# Patient Record
Sex: MALE | Race: WHITE | NOT HISPANIC OR LATINO
[De-identification: names, ages, dates, MRNs, and addresses within clinical notes are randomized per-mention and may not be internally consistent; named-entity substitution may affect disease eponyms.]

---

## 2017-03-29 ENCOUNTER — APPOINTMENT (OUTPATIENT)
Dept: UROLOGY | Facility: CLINIC | Age: 57
End: 2017-03-29

## 2017-04-27 PROBLEM — Z87.442 HISTORY OF RENAL CALCULI: Status: RESOLVED | Noted: 2017-04-27 | Resolved: 2017-04-27

## 2017-04-27 PROBLEM — I86.1 VARICOCELE: Status: RESOLVED | Noted: 2017-04-27 | Resolved: 2017-04-27

## 2017-04-27 PROBLEM — N35.9 MEATAL STENOSIS: Status: RESOLVED | Noted: 2017-04-27 | Resolved: 2017-04-27

## 2017-04-27 PROBLEM — K46.9 HERNIA: Status: RESOLVED | Noted: 2017-04-27 | Resolved: 2017-04-27

## 2017-04-27 PROBLEM — Z80.9 FAMILY HISTORY OF MALIGNANT NEOPLASM: Status: ACTIVE | Noted: 2017-04-27

## 2017-04-27 PROBLEM — Z86.39 HISTORY OF HYPERCHOLESTEROLEMIA: Status: RESOLVED | Noted: 2017-04-27 | Resolved: 2017-04-27

## 2017-04-27 PROBLEM — Z87.19 HISTORY OF ESOPHAGEAL REFLUX: Status: RESOLVED | Noted: 2017-04-27 | Resolved: 2017-04-27

## 2017-04-28 ENCOUNTER — APPOINTMENT (OUTPATIENT)
Dept: UROLOGY | Facility: CLINIC | Age: 57
End: 2017-04-28

## 2017-04-28 DIAGNOSIS — Z00.00 ENCOUNTER FOR GENERAL ADULT MEDICAL EXAMINATION W/OUT ABNORMAL FINDINGS: ICD-10-CM

## 2017-04-28 DIAGNOSIS — Z87.19 PERSONAL HISTORY OF OTHER DISEASES OF THE DIGESTIVE SYSTEM: ICD-10-CM

## 2017-04-28 DIAGNOSIS — Z87.442 PERSONAL HISTORY OF URINARY CALCULI: ICD-10-CM

## 2017-04-28 DIAGNOSIS — N35.9 URETHRAL STRICTURE, UNSPECIFIED: ICD-10-CM

## 2017-04-28 DIAGNOSIS — Z80.9 FAMILY HISTORY OF MALIGNANT NEOPLASM, UNSPECIFIED: ICD-10-CM

## 2017-04-28 DIAGNOSIS — I86.1 SCROTAL VARICES: ICD-10-CM

## 2017-04-28 DIAGNOSIS — K46.9 UNSPECIFIED ABDOMINAL HERNIA W/OUT OBSTRUCTION OR GANGRENE: ICD-10-CM

## 2017-04-28 DIAGNOSIS — Z86.39 PERSONAL HISTORY OF OTHER ENDOCRINE, NUTRITIONAL AND METABOLIC DISEASE: ICD-10-CM

## 2017-04-28 RX ORDER — SILDENAFIL CITRATE 100 MG/1
100 TABLET, FILM COATED ORAL
Qty: 6 | Refills: 3 | Status: ACTIVE | COMMUNITY
Start: 2017-04-28 | End: 1900-01-01

## 2017-04-28 RX ORDER — TADALAFIL 20 MG/1
20 TABLET, FILM COATED ORAL
Qty: 6 | Refills: 1 | Status: ACTIVE | COMMUNITY
Start: 2017-04-28 | End: 1900-01-01

## 2017-06-08 ENCOUNTER — OTHER (OUTPATIENT)
Age: 57
End: 2017-06-08

## 2017-06-26 ENCOUNTER — OTHER (OUTPATIENT)
Age: 57
End: 2017-06-26

## 2017-07-21 ENCOUNTER — TRANSCRIPTION ENCOUNTER (OUTPATIENT)
Age: 57
End: 2017-07-21

## 2017-07-21 ENCOUNTER — APPOINTMENT (OUTPATIENT)
Dept: UROLOGY | Facility: CLINIC | Age: 57
End: 2017-07-21

## 2017-07-21 VITALS
WEIGHT: 208 LBS | DIASTOLIC BLOOD PRESSURE: 80 MMHG | SYSTOLIC BLOOD PRESSURE: 122 MMHG | HEART RATE: 68 BPM | BODY MASS INDEX: 29.12 KG/M2 | HEIGHT: 71 IN | RESPIRATION RATE: 16 BRPM

## 2017-07-24 LAB
ANION GAP SERPL CALC-SCNC: 16 MMOL/L
APPEARANCE: CLEAR
BACTERIA UR CULT: NORMAL
BACTERIA: NEGATIVE
BILIRUBIN URINE: NEGATIVE
BLOOD URINE: NEGATIVE
BUN SERPL-MCNC: 28 MG/DL
CALCIUM SERPL-MCNC: 9.6 MG/DL
CHLORIDE SERPL-SCNC: 105 MMOL/L
CO2 SERPL-SCNC: 23 MMOL/L
COLOR: YELLOW
CREAT SERPL-MCNC: 1.57 MG/DL
ESTRADIOL SERPL-MCNC: 33 PG/ML
FSH SERPL-MCNC: 31.9 IU/L
GLUCOSE QUALITATIVE U: NORMAL MG/DL
GLUCOSE SERPL-MCNC: 86 MG/DL
HYALINE CASTS: 0 /LPF
KETONES URINE: NEGATIVE
LEUKOCYTE ESTERASE URINE: NEGATIVE
LH SERPL-ACNC: 18.4 IU/L
MICROSCOPIC-UA: NORMAL
NITRITE URINE: NEGATIVE
PH URINE: 5
POTASSIUM SERPL-SCNC: 4.7 MMOL/L
PROTEIN URINE: NEGATIVE MG/DL
RED BLOOD CELLS URINE: 1 /HPF
SODIUM SERPL-SCNC: 144 MMOL/L
SPECIFIC GRAVITY URINE: 1.03
SQUAMOUS EPITHELIAL CELLS: 2 /HPF
TESTOST SERPL-MCNC: 496.3 NG/DL
UROBILINOGEN URINE: NORMAL MG/DL
WHITE BLOOD CELLS URINE: 2 /HPF

## 2017-07-28 ENCOUNTER — MEDICATION RENEWAL (OUTPATIENT)
Age: 57
End: 2017-07-28

## 2017-07-28 RX ORDER — TADALAFIL 20 MG/1
20 TABLET, FILM COATED ORAL
Qty: 6 | Refills: 1 | Status: ACTIVE | COMMUNITY
Start: 2017-07-21 | End: 1900-01-01

## 2017-08-04 ENCOUNTER — APPOINTMENT (OUTPATIENT)
Dept: UROLOGY | Facility: CLINIC | Age: 57
End: 2017-08-04
Payer: COMMERCIAL

## 2017-08-04 VITALS — SYSTOLIC BLOOD PRESSURE: 140 MMHG | RESPIRATION RATE: 16 BRPM | HEART RATE: 60 BPM | DIASTOLIC BLOOD PRESSURE: 80 MMHG

## 2017-08-04 PROCEDURE — 52000 CYSTOURETHROSCOPY: CPT

## 2017-08-08 LAB — CORE LAB FLUID CYTOLOGY: NORMAL

## 2017-08-30 ENCOUNTER — OUTPATIENT (OUTPATIENT)
Dept: OUTPATIENT SERVICES | Facility: HOSPITAL | Age: 57
LOS: 1 days | End: 2017-08-30
Payer: COMMERCIAL

## 2017-08-30 ENCOUNTER — APPOINTMENT (OUTPATIENT)
Dept: SURGERY | Facility: CLINIC | Age: 57
End: 2017-08-30

## 2017-08-30 DIAGNOSIS — Z01.818 ENCOUNTER FOR OTHER PREPROCEDURAL EXAMINATION: ICD-10-CM

## 2017-08-30 DIAGNOSIS — R35.0 FREQUENCY OF MICTURITION: ICD-10-CM

## 2017-08-30 LAB
ANION GAP SERPL CALC-SCNC: 13 MMOL/L — SIGNIFICANT CHANGE UP (ref 5–17)
APPEARANCE UR: CLEAR — SIGNIFICANT CHANGE UP
BILIRUB UR-MCNC: NEGATIVE — SIGNIFICANT CHANGE UP
BUN SERPL-MCNC: 23 MG/DL — SIGNIFICANT CHANGE UP (ref 7–23)
CALCIUM SERPL-MCNC: 9.5 MG/DL — SIGNIFICANT CHANGE UP (ref 8.4–10.5)
CHLORIDE SERPL-SCNC: 103 MMOL/L — SIGNIFICANT CHANGE UP (ref 96–108)
CO2 SERPL-SCNC: 26 MMOL/L — SIGNIFICANT CHANGE UP (ref 22–31)
COLOR SPEC: YELLOW — SIGNIFICANT CHANGE UP
CREAT SERPL-MCNC: 1.4 MG/DL — HIGH (ref 0.5–1.3)
DIFF PNL FLD: NEGATIVE — SIGNIFICANT CHANGE UP
GLUCOSE SERPL-MCNC: 99 MG/DL — SIGNIFICANT CHANGE UP (ref 70–99)
GLUCOSE UR QL: NEGATIVE — SIGNIFICANT CHANGE UP
HCT VFR BLD CALC: 43.6 % — SIGNIFICANT CHANGE UP (ref 39–50)
HGB BLD-MCNC: 14.9 G/DL — SIGNIFICANT CHANGE UP (ref 13–17)
KETONES UR-MCNC: NEGATIVE — SIGNIFICANT CHANGE UP
LEUKOCYTE ESTERASE UR-ACNC: NEGATIVE — SIGNIFICANT CHANGE UP
MCHC RBC-ENTMCNC: 31 PG — SIGNIFICANT CHANGE UP (ref 27–34)
MCHC RBC-ENTMCNC: 34.2 G/DL — SIGNIFICANT CHANGE UP (ref 32–36)
MCV RBC AUTO: 90.8 FL — SIGNIFICANT CHANGE UP (ref 80–100)
NITRITE UR-MCNC: NEGATIVE — SIGNIFICANT CHANGE UP
PH UR: 5.5 — SIGNIFICANT CHANGE UP (ref 5–8)
PLATELET # BLD AUTO: 181 K/UL — SIGNIFICANT CHANGE UP (ref 150–400)
POTASSIUM SERPL-MCNC: 4.6 MMOL/L — SIGNIFICANT CHANGE UP (ref 3.5–5.3)
POTASSIUM SERPL-SCNC: 4.6 MMOL/L — SIGNIFICANT CHANGE UP (ref 3.5–5.3)
PROT UR-MCNC: NEGATIVE MG/DL — SIGNIFICANT CHANGE UP
RBC # BLD: 4.8 M/UL — SIGNIFICANT CHANGE UP (ref 4.2–5.8)
RBC # FLD: 12.4 % — SIGNIFICANT CHANGE UP (ref 10.3–16.9)
SODIUM SERPL-SCNC: 142 MMOL/L — SIGNIFICANT CHANGE UP (ref 135–145)
SP GR SPEC: 1.02 — SIGNIFICANT CHANGE UP (ref 1–1.03)
UROBILINOGEN FLD QL: 0.2 E.U./DL — SIGNIFICANT CHANGE UP
WBC # BLD: 8 K/UL — SIGNIFICANT CHANGE UP (ref 3.8–10.5)
WBC # FLD AUTO: 8 K/UL — SIGNIFICANT CHANGE UP (ref 3.8–10.5)

## 2017-08-30 PROCEDURE — 93010 ELECTROCARDIOGRAM REPORT: CPT

## 2017-09-19 ENCOUNTER — RESULT REVIEW (OUTPATIENT)
Age: 57
End: 2017-09-19

## 2017-09-19 ENCOUNTER — OUTPATIENT (OUTPATIENT)
Dept: OUTPATIENT SERVICES | Facility: HOSPITAL | Age: 57
LOS: 1 days | Discharge: HOME | End: 2017-09-19

## 2017-09-22 DIAGNOSIS — I10 ESSENTIAL (PRIMARY) HYPERTENSION: ICD-10-CM

## 2017-09-22 DIAGNOSIS — N40.0 BENIGN PROSTATIC HYPERPLASIA WITHOUT LOWER URINARY TRACT SYMPTOMS: ICD-10-CM

## 2017-09-22 DIAGNOSIS — G47.33 OBSTRUCTIVE SLEEP APNEA (ADULT) (PEDIATRIC): ICD-10-CM

## 2017-09-24 ENCOUNTER — MOBILE ON CALL (OUTPATIENT)
Age: 57
End: 2017-09-24

## 2017-09-25 ENCOUNTER — APPOINTMENT (OUTPATIENT)
Dept: UROLOGY | Facility: CLINIC | Age: 57
End: 2017-09-25
Payer: COMMERCIAL

## 2017-09-25 PROCEDURE — 99214 OFFICE O/P EST MOD 30 MIN: CPT

## 2017-12-22 ENCOUNTER — APPOINTMENT (OUTPATIENT)
Dept: UROLOGY | Facility: CLINIC | Age: 57
End: 2017-12-22
Payer: COMMERCIAL

## 2017-12-22 PROCEDURE — 99213 OFFICE O/P EST LOW 20 MIN: CPT

## 2018-07-20 ENCOUNTER — APPOINTMENT (OUTPATIENT)
Dept: UROLOGY | Facility: CLINIC | Age: 58
End: 2018-07-20
Payer: COMMERCIAL

## 2018-07-20 DIAGNOSIS — N53.19 OTHER EJACULATORY DYSFUNCTION: ICD-10-CM

## 2018-07-20 PROCEDURE — 51741 ELECTRO-UROFLOWMETRY FIRST: CPT

## 2018-07-20 PROCEDURE — 99213 OFFICE O/P EST LOW 20 MIN: CPT | Mod: 25

## 2018-07-21 ENCOUNTER — TRANSCRIPTION ENCOUNTER (OUTPATIENT)
Age: 58
End: 2018-07-21

## 2018-09-17 ENCOUNTER — APPOINTMENT (OUTPATIENT)
Dept: UROLOGY | Facility: CLINIC | Age: 58
End: 2018-09-17
Payer: COMMERCIAL

## 2018-09-17 VITALS — DIASTOLIC BLOOD PRESSURE: 78 MMHG | SYSTOLIC BLOOD PRESSURE: 128 MMHG

## 2018-09-17 PROCEDURE — 76872 US TRANSRECTAL: CPT

## 2018-10-16 ENCOUNTER — OUTPATIENT (OUTPATIENT)
Dept: OUTPATIENT SERVICES | Facility: HOSPITAL | Age: 58
LOS: 1 days | Discharge: HOME | End: 2018-10-16
Payer: COMMERCIAL

## 2018-10-16 ENCOUNTER — APPOINTMENT (OUTPATIENT)
Dept: UROLOGY | Facility: AMBULATORY SURGERY CENTER | Age: 58
End: 2018-10-16

## 2018-10-16 PROCEDURE — 52442 CYSTO INS TRNSPRSTC IMPLT EA: CPT

## 2018-10-16 PROCEDURE — 52441 CYSTO INSJ TRNSPRSTC 1 IMPLT: CPT

## 2018-10-16 RX ORDER — OXYCODONE HYDROCHLORIDE 5 MG/1
1 TABLET ORAL
Qty: 10 | Refills: 0 | OUTPATIENT
Start: 2018-10-16

## 2018-10-16 RX ORDER — CEPHALEXIN 500 MG
1 CAPSULE ORAL
Qty: 10 | Refills: 0 | OUTPATIENT
Start: 2018-10-16 | End: 2018-10-20

## 2018-10-19 ENCOUNTER — APPOINTMENT (OUTPATIENT)
Dept: UROLOGY | Facility: CLINIC | Age: 58
End: 2018-10-19
Payer: COMMERCIAL

## 2018-10-19 PROCEDURE — 99212 OFFICE O/P EST SF 10 MIN: CPT | Mod: 25

## 2018-10-19 PROCEDURE — 51798 US URINE CAPACITY MEASURE: CPT | Mod: 59

## 2018-10-19 PROCEDURE — 51741 ELECTRO-UROFLOWMETRY FIRST: CPT

## 2018-12-03 ENCOUNTER — APPOINTMENT (OUTPATIENT)
Dept: UROLOGY | Facility: CLINIC | Age: 58
End: 2018-12-03
Payer: COMMERCIAL

## 2018-12-03 PROCEDURE — 99213 OFFICE O/P EST LOW 20 MIN: CPT | Mod: 25

## 2018-12-03 PROCEDURE — 51798 US URINE CAPACITY MEASURE: CPT

## 2018-12-03 PROCEDURE — 51741 ELECTRO-UROFLOWMETRY FIRST: CPT

## 2019-06-03 ENCOUNTER — APPOINTMENT (OUTPATIENT)
Dept: UROLOGY | Facility: CLINIC | Age: 59
End: 2019-06-03
Payer: COMMERCIAL

## 2019-06-03 PROCEDURE — 99213 OFFICE O/P EST LOW 20 MIN: CPT

## 2019-06-03 NOTE — HISTORY OF PRESENT ILLNESS
[None] : no symptoms [FreeTextEntry1] : Urinary stream\par s/p urethral biopsy\par \par Infertility\par s/p varicocelecomy\par s/p clomid therapy\par \par 12.22.2017\par patient seen urgently in light of ? rise in PSA.\par patient continues on clomid\par PSA 3.3\par \par 7.20.2018\par PSA 1.9 (6/26/2018)\par T 515 \par free T 12.9\par estradiol 43.5\par FSH 33.6; LH 22.4\par cbc normal\par urine normal\par creatinine 1.9 after omaprazole starting seeing Milan Mcdonnell being evaluated\par USG is schedule\par \par 10.16.2018 UroLift\par \par 10.18.2018\par patient called yesterday\par complaining of spasm\par removed catheter at my direction\par urinating well with some urgency\par no pain, fevers chills etc \par  \par 12.3.2018\par Pleased with response to urolift\par \par  6.3.2019\par improved urinary symptoms after urolift\par still requires flomax\par no nocturia\par AUA sx 4\par YURIY 21 with cialis 20 mg [Urinary Incontinence] : no urinary incontinence [Urinary Retention] : no urinary retention [Urinary Urgency] : no urinary urgency [Urinary Frequency] : no urinary frequency [Nocturia] : no nocturia [Straining] : no straining [Weak Stream] : no weak stream

## 2019-06-03 NOTE — ASSESSMENT
[FreeTextEntry1] : creat 1.45\par ua normal\par Testosterone 424\par estradiol; 26\par PSA 2.1\par not interested in checking PSA\par Will stop clomid and reasess T in 3 months\par Patient urinary symptoms improved after urolift\par Will stop clomid and assess T\par Continue on flomax 0.4 mg daily\par \par Fu off of Clomid and sexual function in 9/2019

## 2019-06-03 NOTE — PHYSICAL EXAM
[Bowel Sounds] : normal bowel sounds [Abdomen Soft] : soft [Abdomen Tenderness] : non-tender [Urethral Meatus] : meatus normal [Penis Abnormality] : normal circumcised penis [Epididymis] : the epididymides were normal [Testes Tenderness] : no tenderness of the testes [FreeTextEntry1] : atrophic bilaterally; prostate +2  ; smooth

## 2019-09-09 ENCOUNTER — APPOINTMENT (OUTPATIENT)
Dept: UROLOGY | Facility: CLINIC | Age: 59
End: 2019-09-09

## 2019-09-27 ENCOUNTER — NON-APPOINTMENT (OUTPATIENT)
Age: 59
End: 2019-09-27

## 2019-09-27 ENCOUNTER — APPOINTMENT (OUTPATIENT)
Dept: OPHTHALMOLOGY | Facility: CLINIC | Age: 59
End: 2019-09-27
Payer: COMMERCIAL

## 2019-09-27 ENCOUNTER — APPOINTMENT (OUTPATIENT)
Dept: UROLOGY | Facility: CLINIC | Age: 59
End: 2019-09-27
Payer: COMMERCIAL

## 2019-09-27 VITALS — SYSTOLIC BLOOD PRESSURE: 130 MMHG | RESPIRATION RATE: 20 BRPM | DIASTOLIC BLOOD PRESSURE: 70 MMHG | HEART RATE: 80 BPM

## 2019-09-27 PROCEDURE — 99213 OFFICE O/P EST LOW 20 MIN: CPT

## 2019-09-27 PROCEDURE — 92025 CPTRIZED CORNEAL TOPOGRAPHY: CPT

## 2019-09-27 PROCEDURE — 92002 INTRM OPH EXAM NEW PATIENT: CPT

## 2019-09-27 NOTE — ASSESSMENT
[FreeTextEntry1] : creat 1.45   - off benicar started amlodipine 1.3\par ua normal\par Testosterone 424  off of clomid 364 free 5.5 (7,2-24)\par estradiol; 26           off of clomid 23.5\par PSA 2.1                  off of clomid 1.37\par \par DIscussed labs and symptoms\par Responds to cialis\par Generally fatigue but not clear if improved when on clomid\par \par \par We had an extensive discussion re Risks of T replacement; Patient was informed about Black box warning from FDA.\par We discussed recent data regarding increased risk of coronary plaque size, heart disease; thrombolic event; increased Hbg/Hct, breast tenderness; acne; irritability; sleep apnea and increased urinary symptoms; effect on testicular function including suppression of spermatogenesis; hair loss (male pattern baldness) effect on LFTS; effect on prostate cancer.\par Different treatment approaches were discussed including IM, transdermal and Testopel\par We discussed advantages and disadvantages of each\par \par Initially patient did not want to be treated and then decided to proceed with transdermal T\par The patient expressed fully understanding of the information provided, the consequences and the management.\par Will return for labs and fu 3 week/4 weeks\par Flow at time of next visit.

## 2019-09-27 NOTE — HISTORY OF PRESENT ILLNESS
[FreeTextEntry1] : Urinary stream\par s/p urethral biopsy\par \par Infertility\par s/p varicocelecomy\par s/p clomid therapy\par \par 12.22.2017\par patient seen urgently in light of ? rise in PSA.\par patient continues on clomid\par PSA 3.3\par \par 7.20.2018\par PSA 1.9 (6/26/2018)\par T 515 \par free T 12.9\par estradiol 43.5\par FSH 33.6; LH 22.4\par cbc normal\par urine normal\par creatinine 1.9 after omaprazole starting seeing Milan Mcdonnell being evaluated\par USG is schedule\par \par 10.16.2018 UroLift\par \par 10.18.2018\par patient called yesterday\par complaining of spasm\par removed catheter at my direction\par urinating well with some urgency\par no pain, fevers chills etc \par  \par 12.3.2018\par Pleased with response to urolift\par \par  6.3.2019\par improved urinary symptoms after urolift\par still requires flomax\par no nocturia\par AUA sx 4\par YURIY 21 with cialis 20 mg\par \par 9.27.2019\par continues to be pleased with urinary symptoms\par continues onflomax\par creatine 1.3t\par Testosterone  \par Lh 6.9\par FSH 14.0\par \par free T 5.5 (7-24)\par estradiol 23.5\par continues on Cialis prn\par

## 2019-10-29 LAB
ESTRADIOL SERPL-MCNC: 26 PG/ML
HCT VFR BLD CALC: 42.6 %
HGB BLD-MCNC: 14.2 G/DL

## 2019-11-03 ENCOUNTER — FORM ENCOUNTER (OUTPATIENT)
Age: 59
End: 2019-11-03

## 2019-11-04 ENCOUNTER — APPOINTMENT (OUTPATIENT)
Dept: RADIOLOGY | Facility: CLINIC | Age: 59
End: 2019-11-04
Payer: COMMERCIAL

## 2019-11-04 ENCOUNTER — APPOINTMENT (OUTPATIENT)
Dept: UROLOGY | Facility: CLINIC | Age: 59
End: 2019-11-04
Payer: COMMERCIAL

## 2019-11-04 ENCOUNTER — OUTPATIENT (OUTPATIENT)
Dept: OUTPATIENT SERVICES | Facility: HOSPITAL | Age: 59
LOS: 1 days | End: 2019-11-04

## 2019-11-04 DIAGNOSIS — N32.89 OTHER SPECIFIED DISORDERS OF BLADDER: ICD-10-CM

## 2019-11-04 LAB
TESTOST BND SERPL-MCNC: 15.8 PG/ML
TESTOST SERPL-MCNC: 560.6 NG/DL

## 2019-11-04 PROCEDURE — 77085 DXA BONE DENSITY AXL VRT FX: CPT | Mod: 26

## 2019-11-04 PROCEDURE — 99213 OFFICE O/P EST LOW 20 MIN: CPT

## 2019-11-04 RX ORDER — TOLTERODINE TARTRATE 2 MG/1
2 CAPSULE, EXTENDED RELEASE ORAL DAILY
Qty: 30 | Refills: 1 | Status: DISCONTINUED | OUTPATIENT
Start: 2017-07-21 | End: 2019-11-04

## 2019-11-04 RX ORDER — CLOMIPHENE CITRATE 50 MG/1
50 TABLET ORAL DAILY
Qty: 15 | Refills: 0 | Status: DISCONTINUED | COMMUNITY
Start: 2017-04-28 | End: 2019-11-04

## 2019-11-04 NOTE — HISTORY OF PRESENT ILLNESS
[FreeTextEntry1] : Urinary stream\par s/p urethral biopsy\par \par Infertility\par s/p varicocelecomy\par s/p clomid therapy\par \par 12.22.2017\par patient seen urgently in light of ? rise in PSA.\par patient continues on clomid\par PSA 3.3\par \par 7.20.2018\par PSA 1.9 (6/26/2018)\par T 515 \par free T 12.9\par estradiol 43.5\par FSH 33.6; LH 22.4\par cbc normal\par urine normal\par creatinine 1.9 after omaprazole starting seeing Milan Mcdonnell being evaluated\par USG is schedule\par \par 10.16.2018 UroLift\par \par 10.18.2018\par patient called yesterday\par complaining of spasm\par removed catheter at my direction\par urinating well with some urgency\par no pain, fevers chills etc \par  \par 12.3.2018\par Pleased with response to urolift\par \par  6.3.2019\par improved urinary symptoms after urolift\par still requires flomax\par no nocturia\par AUA sx 4\par YURIY 21 with cialis 20 mg\par \par 9.27.2019\par continues to be pleased with urinary symptoms\par continues on flomax\par creatine 1.3t\par Testosterone  \par Lh 6.9\par FSH 14.0\par \par free T 5.5 (7-24)\par estradiol 23.5\par continues on Cialis prn\par \par 11.4.2019\par on testim one packet daily

## 2019-11-04 NOTE — ASSESSMENT
[FreeTextEntry1] : Patient tolerating T transdermal\par Complaining of seasonal affective disorder\par Sexual function good; although breaking up from girlfriend\par Discussed AVEED TESTOPEL or continuing on testosterone\par Dr. Dudley wishes to continue on transdermal testosterone.\par Pleased with response to T \par \par Urinary symptoms stable after UroLift procedure.\par He is pleased with this treatment.\par We will have a uroflow at his next visit in 3 months\par

## 2019-11-06 ENCOUNTER — MEDICATION RENEWAL (OUTPATIENT)
Age: 59
End: 2019-11-06

## 2020-02-03 ENCOUNTER — APPOINTMENT (OUTPATIENT)
Dept: UROLOGY | Facility: CLINIC | Age: 60
End: 2020-02-03
Payer: COMMERCIAL

## 2020-02-03 ENCOUNTER — LABORATORY RESULT (OUTPATIENT)
Age: 60
End: 2020-02-03

## 2020-02-03 VITALS — DIASTOLIC BLOOD PRESSURE: 70 MMHG | SYSTOLIC BLOOD PRESSURE: 120 MMHG

## 2020-02-03 PROCEDURE — 99213 OFFICE O/P EST LOW 20 MIN: CPT

## 2020-02-03 RX ORDER — ASPIRIN ENTERIC COATED TABLETS 81 MG 81 MG/1
81 TABLET, DELAYED RELEASE ORAL
Refills: 0 | Status: ACTIVE | COMMUNITY

## 2020-02-03 RX ORDER — AMLODIPINE BESYLATE 5 MG/1
TABLET ORAL
Refills: 0 | Status: ACTIVE | COMMUNITY

## 2020-02-03 RX ORDER — CIMETIDINE 800 MG
TABLET ORAL
Refills: 0 | Status: ACTIVE | COMMUNITY

## 2020-02-03 NOTE — HISTORY OF PRESENT ILLNESS
[FreeTextEntry1] : Urinary stream\par s/p urethral biopsy\par \par Infertility\par s/p varicocelecomy\par s/p clomid therapy\par \par 12.22.2017\par patient seen urgently in light of ? rise in PSA.\par patient continues on clomid\par PSA 3.3\par \par 7.20.2018\par PSA 1.9 (6/26/2018)\par T 515 \par free T 12.9\par estradiol 43.5\par FSH 33.6; LH 22.4\par cbc normal\par urine normal\par creatinine 1.9 after omaprazole starting seeing Milan Mcdonnell being evaluated\par USG is schedule\par \par 10.16.2018 UroLift\par \par 10.18.2018\par patient called yesterday\par complaining of spasm\par removed catheter at my direction\par urinating well with some urgency\par no pain, fevers chills etc \par  \par 12.3.2018\par Pleased with response to urolift\par \par  6.3.2019\par improved urinary symptoms after urolift\par still requires flomax\par no nocturia\par AUA sx 4\par YURIY 21 with cialis 20 mg\par \par 9.27.2019\par continues to be pleased with urinary symptoms\par continues on flomax\par creatine 1.3t\par Testosterone  \par Lh 6.9\par FSH 14.0\par \par free T 5.5 (7-24)\par estradiol 23.5\par continues on Cialis prn\par \par 11.4.2019\par on testim one packet daily\par \par 2.3.2020\par continues on testim\par energy improved\par AUA sx 5 fow\par sexual function doing well [Urinary Urgency] : no urinary urgency [Urinary Frequency] : no urinary frequency [Nocturia] : no nocturia

## 2020-02-03 NOTE — ASSESSMENT
[FreeTextEntry1] : Patient tolerating T transdermal\par Complaining of seasonal affective disorder\par Sexual function good; although breaking up from girlfriende\par Dr. Dudley wishes to continue on transdermal testosterone.\par Pleased with response to T \par will continue to resend\par \par Urinary symptoms stable after UroLift procedure.\par He is pleased with this treatment.\par feels better on flomax\par will continue\par \par Will reassess:\par testosterone\par PSA\par LFTS\par H/HCt\par 6 months\par \par

## 2020-02-04 ENCOUNTER — TRANSCRIPTION ENCOUNTER (OUTPATIENT)
Age: 60
End: 2020-02-04

## 2020-02-04 LAB
APPEARANCE: CLEAR
BILIRUBIN URINE: NEGATIVE
BLOOD URINE: NEGATIVE
COLOR: YELLOW
GLUCOSE QUALITATIVE U: NEGATIVE
KETONES URINE: NEGATIVE
LEUKOCYTE ESTERASE URINE: NEGATIVE
NITRITE URINE: NEGATIVE
PH URINE: 6
PROTEIN URINE: NEGATIVE
SPECIFIC GRAVITY URINE: 1.02
UROBILINOGEN URINE: NORMAL

## 2020-02-11 ENCOUNTER — TRANSCRIPTION ENCOUNTER (OUTPATIENT)
Age: 60
End: 2020-02-11

## 2020-08-10 ENCOUNTER — APPOINTMENT (OUTPATIENT)
Dept: UROLOGY | Facility: CLINIC | Age: 60
End: 2020-08-10
Payer: COMMERCIAL

## 2020-08-10 VITALS
DIASTOLIC BLOOD PRESSURE: 73 MMHG | SYSTOLIC BLOOD PRESSURE: 124 MMHG | HEIGHT: 71 IN | BODY MASS INDEX: 29.68 KG/M2 | TEMPERATURE: 96.9 F | WEIGHT: 212 LBS

## 2020-08-10 PROCEDURE — 99213 OFFICE O/P EST LOW 20 MIN: CPT

## 2020-08-10 NOTE — ASSESSMENT
[FreeTextEntry1] : Patient tolerating T transdermal\par Complaining of seasonal affective disorder\par Sexual function good; although breaking up from girlfriende\par Dr. Dudley wishes to continue on transdermal testosterone.\par Pleased with response to T \par will continue to resend\par \par Urinary symptoms stable after UROLIFT procedure.\par He is pleased with this treatment.\par feels better on Flomax\par will continue\par \par Will reassess:\par testosterone\par PSA\par LFTS\par H/HCt\par 6 months\par \par \par 8.10.2020\par patient pleased with UROLIFT and tamsulosin\par still feels that he requires tamsulosin\par flow today non diagnostic\par \par sexual function "satisfactory" off of T since May\par He feels less energetic and wishes to resume\par will repeat labs prior to resuming\par \par creatinine has normalized with cessation of cardiac medication\par \par will recheck PSA

## 2020-08-10 NOTE — HISTORY OF PRESENT ILLNESS
[FreeTextEntry1] : Urinary stream\par s/p urethral biopsy\par \par Infertility\par s/p varicocelecomy\par s/p clomid therapy\par \par 12.22.2017\par patient seen urgently in light of ? rise in PSA.\par patient continues on clomid\par PSA 3.3\par \par 7.20.2018\par PSA 1.9 (6/26/2018)\par T 515 \par free T 12.9\par estradiol 43.5\par FSH 33.6; LH 22.4\par cbc normal\par urine normal\par creatinine 1.9 after omaprazole starting seeing Milan Mcdonnell being evaluated\par USG is schedule\par \par 10.16.2018 UroLift\par \par 10.18.2018\par patient called yesterday\par complaining of spasm\par removed catheter at my direction\par urinating well with some urgency\par no pain, fevers chills etc \par  \par 12.3.2018\par Pleased with response to urolift\par \par  6.3.2019\par improved urinary symptoms after urolift\par still requires flomax\par no nocturia\par AUA sx 4\par YURIY 21 with cialis 20 mg\par \par 9.27.2019\par continues to be pleased with urinary symptoms\par continues on flomax\par creatine 1.3t\par Testosterone  \par Lh 6.9\par FSH 14.0\par \par free T 5.5 (7-24)\par estradiol 23.5\par continues on Cialis prn\par \par 11.4.2019\par on testim one packet daily\par \par 2.3.2020\par continues on testim\par energy improved\par AUA sx 5 fow\par sexual function doing well\par \par 8.10.2020\par improved on flomax and urolift\par pleased with symptoms\par seeing Dr Osuna re elevated creatinine\par has been of T \par sexual function is "good enough"\par energy low off of T; off since May 2020\par

## 2020-08-11 ENCOUNTER — TRANSCRIPTION ENCOUNTER (OUTPATIENT)
Age: 60
End: 2020-08-11

## 2020-08-11 LAB
APPEARANCE: CLEAR
BILIRUBIN URINE: NEGATIVE
BLOOD URINE: NEGATIVE
COLOR: NORMAL
GLUCOSE QUALITATIVE U: NEGATIVE
KETONES URINE: NEGATIVE
LEUKOCYTE ESTERASE URINE: NEGATIVE
NITRITE URINE: NEGATIVE
PH URINE: 5.5
PROTEIN URINE: NEGATIVE
PSA SERPL-MCNC: 1.37 NG/ML
SPECIFIC GRAVITY URINE: 1.02
TESTOST SERPL-MCNC: 303 NG/DL
UROBILINOGEN URINE: NORMAL

## 2020-08-12 LAB
ANION GAP SERPL CALC-SCNC: 17 MMOL/L
BUN SERPL-MCNC: 23 MG/DL
CALCIUM SERPL-MCNC: 9.6 MG/DL
CHLORIDE SERPL-SCNC: 106 MMOL/L
CO2 SERPL-SCNC: 22 MMOL/L
CREAT SERPL-MCNC: 1.4 MG/DL
GLUCOSE SERPL-MCNC: 78 MG/DL
POTASSIUM SERPL-SCNC: 5.1 MMOL/L
SODIUM SERPL-SCNC: 145 MMOL/L
TESTOST SERPL-MCNC: 300 NG/DL

## 2020-11-06 ENCOUNTER — APPOINTMENT (OUTPATIENT)
Dept: OPHTHALMOLOGY | Facility: CLINIC | Age: 60
End: 2020-11-06
Payer: COMMERCIAL

## 2020-11-06 ENCOUNTER — NON-APPOINTMENT (OUTPATIENT)
Age: 60
End: 2020-11-06

## 2020-11-06 PROCEDURE — 92250 FUNDUS PHOTOGRAPHY W/I&R: CPT

## 2020-11-06 PROCEDURE — 99072 ADDL SUPL MATRL&STAF TM PHE: CPT

## 2020-11-06 PROCEDURE — 92014 COMPRE OPH EXAM EST PT 1/>: CPT

## 2020-11-08 ENCOUNTER — TRANSCRIPTION ENCOUNTER (OUTPATIENT)
Age: 60
End: 2020-11-08

## 2020-11-09 ENCOUNTER — APPOINTMENT (OUTPATIENT)
Dept: UROLOGY | Facility: CLINIC | Age: 60
End: 2020-11-09

## 2020-11-09 ENCOUNTER — OUTPATIENT (OUTPATIENT)
Dept: OUTPATIENT SERVICES | Facility: HOSPITAL | Age: 60
LOS: 1 days | Discharge: HOME | End: 2020-11-09
Payer: COMMERCIAL

## 2020-11-09 ENCOUNTER — APPOINTMENT (OUTPATIENT)
Dept: OPHTHALMOLOGY | Facility: AMBULATORY SURGERY CENTER | Age: 60
End: 2020-11-09

## 2020-11-09 PROCEDURE — 67108 REPAIR DETACHED RETINA: CPT | Mod: RT

## 2020-11-10 ENCOUNTER — NON-APPOINTMENT (OUTPATIENT)
Age: 60
End: 2020-11-10

## 2020-11-10 ENCOUNTER — APPOINTMENT (OUTPATIENT)
Dept: OPHTHALMOLOGY | Facility: CLINIC | Age: 60
End: 2020-11-10
Payer: COMMERCIAL

## 2020-11-10 PROCEDURE — 99024 POSTOP FOLLOW-UP VISIT: CPT

## 2020-11-17 ENCOUNTER — NON-APPOINTMENT (OUTPATIENT)
Age: 60
End: 2020-11-17

## 2020-11-17 ENCOUNTER — APPOINTMENT (OUTPATIENT)
Dept: OPHTHALMOLOGY | Facility: CLINIC | Age: 60
End: 2020-11-17
Payer: COMMERCIAL

## 2020-11-17 PROCEDURE — 99024 POSTOP FOLLOW-UP VISIT: CPT

## 2020-11-20 ENCOUNTER — APPOINTMENT (OUTPATIENT)
Dept: OPHTHALMOLOGY | Facility: CLINIC | Age: 60
End: 2020-11-20
Payer: COMMERCIAL

## 2020-11-20 ENCOUNTER — NON-APPOINTMENT (OUTPATIENT)
Age: 60
End: 2020-11-20

## 2020-11-20 PROCEDURE — 99024 POSTOP FOLLOW-UP VISIT: CPT

## 2020-11-24 ENCOUNTER — APPOINTMENT (OUTPATIENT)
Dept: OPHTHALMOLOGY | Facility: CLINIC | Age: 60
End: 2020-11-24
Payer: COMMERCIAL

## 2020-11-24 ENCOUNTER — NON-APPOINTMENT (OUTPATIENT)
Age: 60
End: 2020-11-24

## 2020-11-24 PROCEDURE — 99024 POSTOP FOLLOW-UP VISIT: CPT

## 2020-12-01 ENCOUNTER — NON-APPOINTMENT (OUTPATIENT)
Age: 60
End: 2020-12-01

## 2020-12-01 ENCOUNTER — APPOINTMENT (OUTPATIENT)
Dept: OPHTHALMOLOGY | Facility: CLINIC | Age: 60
End: 2020-12-01
Payer: COMMERCIAL

## 2020-12-01 PROCEDURE — 99024 POSTOP FOLLOW-UP VISIT: CPT

## 2020-12-07 ENCOUNTER — APPOINTMENT (OUTPATIENT)
Dept: UROLOGY | Facility: CLINIC | Age: 60
End: 2020-12-07
Payer: COMMERCIAL

## 2020-12-07 ENCOUNTER — LABORATORY RESULT (OUTPATIENT)
Age: 60
End: 2020-12-07

## 2020-12-07 VITALS — SYSTOLIC BLOOD PRESSURE: 149 MMHG | DIASTOLIC BLOOD PRESSURE: 87 MMHG | TEMPERATURE: 98.1 F

## 2020-12-07 PROCEDURE — 99072 ADDL SUPL MATRL&STAF TM PHE: CPT

## 2020-12-07 PROCEDURE — 99213 OFFICE O/P EST LOW 20 MIN: CPT

## 2020-12-07 RX ORDER — TAMSULOSIN HYDROCHLORIDE 0.4 MG/1
0.4 CAPSULE ORAL
Qty: 189 | Refills: 0 | Status: ACTIVE | COMMUNITY

## 2020-12-07 NOTE — ASSESSMENT
[FreeTextEntry1] : Patient tolerating T transdermal\par Complaining of seasonal affective disorder\par Sexual function good; although breaking up from girlfriende\par Dr. Dudley wishes to continue on transdermal testosterone.\par Pleased with response to T \par will continue to resend\par \par Urinary symptoms stable after UROLIFT procedure.\par He is pleased with this treatment.\par feels better on Flomax\par will continue\par \par Will reassess:\par testosterone\par PSA\par LFTS\par H/HCt\par 6 months\par \par \par 8.10.2020\par patient pleased with UROLIFT and tamsulosin\par still feels that he requires tamsulosin\par flow today non diagnostic\par \par sexual function "satisfactory" off of T since May\par He feels less energetic and wishes to resume\par will repeat labs prior to resuming\par \par creatinine has normalized with cessation of cardiac medication\par \par will recheck PSA\par \par 12.07.2020\par # 1  creatinine stable ; bp management with Dr Osuna\par # 2  testosterone replacement - continues on testosterone\par # 3 LUTS on flomax \par # 4  PSA stable

## 2020-12-07 NOTE — HISTORY OF PRESENT ILLNESS
[FreeTextEntry1] : Urinary stream\par s/p urethral biopsy\par \par Infertility\par s/p varicocelecomy\par s/p clomid therapy\par \par 12.22.2017\par patient seen urgently in light of ? rise in PSA.\par patient continues on clomid\par PSA 3.3\par \par 7.20.2018\par PSA 1.9 (6/26/2018)\par T 515 \par free T 12.9\par estradiol 43.5\par FSH 33.6; LH 22.4\par cbc normal\par urine normal\par creatinine 1.9 after omaprazole starting seeing Milan Mcdonnell being evaluated\par USG is schedule\par \par 10.16.2018 UroLift\par \par 10.18.2018\par patient called yesterday\par complaining of spasm\par removed catheter at my direction\par urinating well with some urgency\par no pain, fevers chills etc \par  \par 12.3.2018\par Pleased with response to urolift\par \par  6.3.2019\par improved urinary symptoms after urolift\par still requires flomax\par no nocturia\par AUA sx 4\par YURIY 21 with cialis 20 mg\par \par 9.27.2019\par continues to be pleased with urinary symptoms\par continues on flomax\par creatine 1.3t\par Testosterone  \par Lh 6.9\par FSH 14.0\par \par free T 5.5 (7-24)\par estradiol 23.5\par continues on Cialis prn\par \par 11.4.2019\par on testim one packet daily\par \par 2.3.2020\par continues on testim\par energy improved\par AUA sx 5 fow\par sexual function doing well\par \par 8.10.2020\par improved on flomax and urolift\par pleased with symptoms\par seeing Dr Osuna re elevated creatinine\par has been of T \par sexual function is "good enough"\par energy low off of T; off since May 2020\par \par 12.07.2020\par on tamsulosin and urolift\par continues to be pleased \par improved after urolift [Erectile Dysfunction] : no Erectile Dysfunction

## 2020-12-08 ENCOUNTER — TRANSCRIPTION ENCOUNTER (OUTPATIENT)
Age: 60
End: 2020-12-08

## 2020-12-08 LAB
CREAT SERPL-MCNC: 1.24 MG/DL
HCT VFR BLD CALC: 45.5 %
HGB BLD-MCNC: 14.6 G/DL
TESTOST SERPL-MCNC: 675 NG/DL

## 2020-12-09 ENCOUNTER — TRANSCRIPTION ENCOUNTER (OUTPATIENT)
Age: 60
End: 2020-12-09

## 2020-12-15 ENCOUNTER — NON-APPOINTMENT (OUTPATIENT)
Age: 60
End: 2020-12-15

## 2020-12-15 ENCOUNTER — APPOINTMENT (OUTPATIENT)
Dept: OPHTHALMOLOGY | Facility: CLINIC | Age: 60
End: 2020-12-15
Payer: COMMERCIAL

## 2020-12-15 PROCEDURE — 99024 POSTOP FOLLOW-UP VISIT: CPT

## 2021-02-16 ENCOUNTER — NON-APPOINTMENT (OUTPATIENT)
Age: 61
End: 2021-02-16

## 2021-02-16 ENCOUNTER — APPOINTMENT (OUTPATIENT)
Dept: OPHTHALMOLOGY | Facility: CLINIC | Age: 61
End: 2021-02-16
Payer: COMMERCIAL

## 2021-02-16 PROCEDURE — 92012 INTRM OPH EXAM EST PATIENT: CPT

## 2021-02-16 PROCEDURE — 99072 ADDL SUPL MATRL&STAF TM PHE: CPT

## 2021-02-16 PROCEDURE — 92134 CPTRZ OPH DX IMG PST SGM RTA: CPT

## 2021-05-05 ENCOUNTER — APPOINTMENT (OUTPATIENT)
Dept: OPHTHALMOLOGY | Facility: CLINIC | Age: 61
End: 2021-05-05
Payer: COMMERCIAL

## 2021-05-05 ENCOUNTER — NON-APPOINTMENT (OUTPATIENT)
Age: 61
End: 2021-05-05

## 2021-05-05 PROCEDURE — 92136 OPHTHALMIC BIOMETRY: CPT

## 2021-05-05 PROCEDURE — 92025 CPTRIZED CORNEAL TOPOGRAPHY: CPT

## 2021-05-05 PROCEDURE — 99072 ADDL SUPL MATRL&STAF TM PHE: CPT

## 2021-05-05 PROCEDURE — 92012 INTRM OPH EXAM EST PATIENT: CPT

## 2021-06-07 ENCOUNTER — APPOINTMENT (OUTPATIENT)
Dept: UROLOGY | Facility: CLINIC | Age: 61
End: 2021-06-07
Payer: COMMERCIAL

## 2021-06-07 PROCEDURE — 99072 ADDL SUPL MATRL&STAF TM PHE: CPT

## 2021-06-07 PROCEDURE — 51741 ELECTRO-UROFLOWMETRY FIRST: CPT

## 2021-06-07 PROCEDURE — 99213 OFFICE O/P EST LOW 20 MIN: CPT | Mod: 25

## 2021-06-07 NOTE — HISTORY OF PRESENT ILLNESS
[FreeTextEntry1] : Urinary stream\par s/p urethral biopsy\par \par Infertility\par s/p varicocelecomy\par s/p clomid therapy\par \par 12.22.2017\par patient seen urgently in light of ? rise in PSA.\par patient continues on clomid\par PSA 3.3\par \par 7.20.2018\par PSA 1.9 (6/26/2018)\par T 515 \par free T 12.9\par estradiol 43.5\par FSH 33.6; LH 22.4\par cbc normal\par urine normal\par creatinine 1.9 after omaprazole starting seeing Milan Mcdonnell being evaluated\par USG is schedule\par \par 10.16.2018 UroLift\par \par 10.18.2018\par patient called yesterday\par complaining of spasm\par removed catheter at my direction\par urinating well with some urgency\par no pain, fevers chills etc \par  \par 12.3.2018\par Pleased with response to urolift\par \par  6.3.2019\par improved urinary symptoms after urolift\par still requires flomax\par no nocturia\par AUA sx 4\par YURIY 21 with cialis 20 mg\par \par 9.27.2019\par continues to be pleased with urinary symptoms\par continues on flomax\par creatine 1.3t\par Testosterone  \par Lh 6.9\par FSH 14.0\par \par free T 5.5 (7-24)\par estradiol 23.5\par continues on Cialis prn\par \par 11.4.2019\par on testim one packet daily\par \par 2.3.2020\par continues on testim\par energy improved\par AUA sx 5 fow\par sexual function doing well\par \par 8.10.2020\par improved on flomax and urolift\par pleased with symptoms\par seeing Dr Osuna re elevated creatinine\par has been of T \par sexual function is "good enough"\par energy low off of T; off since May 2020\par \par 12.07.2020\par on tamsulosin and urolift\par continues to be pleased \par improved after urolift\par \par 6.7.2021\par YURIY 24 usually does not need tadalafil\par IPSS 5 tamsulosin\par s.p urolift\par

## 2021-06-07 NOTE — ASSESSMENT
[FreeTextEntry1] : Patient tolerating T transdermal\par Complaining of seasonal affective disorder\par Sexual function good; although breaking up from girlfriende\par Dr. Dudley wishes to continue on transdermal testosterone.\par Pleased with response to T \par will continue to resend\par \par Urinary symptoms stable after UROLIFT procedure.\par He is pleased with this treatment.\par feels better on Flomax\par will continue\par \par Will reassess:\par testosterone\par PSA\par LFTS\par H/HCt\par 6 months\par \par \par 8.10.2020\par patient pleased with UROLIFT and tamsulosin\par still feels that he requires tamsulosin\par flow today non diagnostic\par \par sexual function "satisfactory" off of T since May\par He feels less energetic and wishes to resume\par will repeat labs prior to resuming\par \par creatinine has normalized with cessation of cardiac medication\par \par will recheck PSA\par \par 12.07.2020\par # 1  creatinine stable ; bp management with Dr Osuna\par # 2  testosterone replacement - continues on testosterone\par # 3 LUTS on flomax \par # 4  PSA stable\par \par 6.7.2021\par Bun 18\par creat 1.29\par testosterone\par hemoglobin 14.2\par hematocrit  41.6\par testosterone\par FSH 11.9 \par LH 6.4\par Testosterone  365  free t 5.1\par PSA 1.8\par discussed increasing to 2 packets per day\par labs in 3 weeks\par \par patient concern that LUTS worse (flow noted)\par decreased stream more urgency and nocturia \par continues on tamsulosin\par interested in repeat Urolift\par will defer until after cataract\par

## 2021-06-13 ENCOUNTER — TRANSCRIPTION ENCOUNTER (OUTPATIENT)
Age: 61
End: 2021-06-13

## 2021-06-14 ENCOUNTER — APPOINTMENT (OUTPATIENT)
Dept: OPHTHALMOLOGY | Facility: AMBULATORY SURGERY CENTER | Age: 61
End: 2021-06-14

## 2021-06-14 ENCOUNTER — NON-APPOINTMENT (OUTPATIENT)
Age: 61
End: 2021-06-14

## 2021-06-14 ENCOUNTER — OUTPATIENT (OUTPATIENT)
Dept: OUTPATIENT SERVICES | Facility: HOSPITAL | Age: 61
LOS: 1 days | Discharge: HOME | End: 2021-06-14
Payer: COMMERCIAL

## 2021-06-14 PROCEDURE — 66984 XCAPSL CTRC RMVL W/O ECP: CPT | Mod: LT

## 2021-06-14 PROCEDURE — 6698F: CPT | Mod: LT

## 2021-06-15 ENCOUNTER — NON-APPOINTMENT (OUTPATIENT)
Age: 61
End: 2021-06-15

## 2021-06-15 ENCOUNTER — APPOINTMENT (OUTPATIENT)
Dept: OPHTHALMOLOGY | Facility: CLINIC | Age: 61
End: 2021-06-15
Payer: COMMERCIAL

## 2021-06-15 PROCEDURE — 99024 POSTOP FOLLOW-UP VISIT: CPT

## 2021-06-16 ENCOUNTER — TRANSCRIPTION ENCOUNTER (OUTPATIENT)
Age: 61
End: 2021-06-16

## 2021-06-17 ENCOUNTER — OUTPATIENT (OUTPATIENT)
Dept: OUTPATIENT SERVICES | Facility: HOSPITAL | Age: 61
LOS: 1 days | Discharge: HOME | End: 2021-06-17
Payer: COMMERCIAL

## 2021-06-17 PROCEDURE — 66985 INSERT LENS PROSTHESIS: CPT | Mod: RT

## 2021-06-17 PROCEDURE — 66852 REMOVAL OF LENS MATERIAL: CPT | Mod: RT

## 2021-06-18 ENCOUNTER — NON-APPOINTMENT (OUTPATIENT)
Age: 61
End: 2021-06-18

## 2021-06-18 ENCOUNTER — APPOINTMENT (OUTPATIENT)
Dept: OPHTHALMOLOGY | Facility: CLINIC | Age: 61
End: 2021-06-18
Payer: COMMERCIAL

## 2021-06-18 PROCEDURE — 99024 POSTOP FOLLOW-UP VISIT: CPT

## 2021-06-22 ENCOUNTER — NON-APPOINTMENT (OUTPATIENT)
Age: 61
End: 2021-06-22

## 2021-06-22 ENCOUNTER — APPOINTMENT (OUTPATIENT)
Dept: OPHTHALMOLOGY | Facility: CLINIC | Age: 61
End: 2021-06-22
Payer: COMMERCIAL

## 2021-06-22 PROCEDURE — 99024 POSTOP FOLLOW-UP VISIT: CPT

## 2021-07-05 ENCOUNTER — TRANSCRIPTION ENCOUNTER (OUTPATIENT)
Age: 61
End: 2021-07-05

## 2021-07-05 LAB
HCT VFR BLD CALC: 42.4 %
HGB BLD-MCNC: 14.6 G/DL
TESTOST SERPL-MCNC: 548 NG/DL

## 2021-07-13 ENCOUNTER — APPOINTMENT (OUTPATIENT)
Dept: OPHTHALMOLOGY | Facility: CLINIC | Age: 61
End: 2021-07-13
Payer: COMMERCIAL

## 2021-07-13 ENCOUNTER — NON-APPOINTMENT (OUTPATIENT)
Age: 61
End: 2021-07-13

## 2021-07-13 PROCEDURE — 99024 POSTOP FOLLOW-UP VISIT: CPT

## 2021-07-19 ENCOUNTER — APPOINTMENT (OUTPATIENT)
Dept: UROLOGY | Facility: CLINIC | Age: 61
End: 2021-07-19
Payer: COMMERCIAL

## 2021-07-19 VITALS — SYSTOLIC BLOOD PRESSURE: 151 MMHG | TEMPERATURE: 98.1 F | DIASTOLIC BLOOD PRESSURE: 83 MMHG

## 2021-07-19 PROCEDURE — 52000 CYSTOURETHROSCOPY: CPT

## 2021-07-19 PROCEDURE — 99072 ADDL SUPL MATRL&STAF TM PHE: CPT

## 2021-08-10 ENCOUNTER — APPOINTMENT (OUTPATIENT)
Dept: OPHTHALMOLOGY | Facility: CLINIC | Age: 61
End: 2021-08-10
Payer: COMMERCIAL

## 2021-08-10 ENCOUNTER — NON-APPOINTMENT (OUTPATIENT)
Age: 61
End: 2021-08-10

## 2021-08-10 PROCEDURE — 99024 POSTOP FOLLOW-UP VISIT: CPT

## 2021-08-24 ENCOUNTER — APPOINTMENT (OUTPATIENT)
Dept: OPHTHALMOLOGY | Facility: CLINIC | Age: 61
End: 2021-08-24
Payer: COMMERCIAL

## 2021-08-24 ENCOUNTER — NON-APPOINTMENT (OUTPATIENT)
Age: 61
End: 2021-08-24

## 2021-08-24 PROCEDURE — 99024 POSTOP FOLLOW-UP VISIT: CPT

## 2021-09-21 ENCOUNTER — APPOINTMENT (OUTPATIENT)
Dept: OPHTHALMOLOGY | Facility: CLINIC | Age: 61
End: 2021-09-21
Payer: COMMERCIAL

## 2021-09-21 ENCOUNTER — NON-APPOINTMENT (OUTPATIENT)
Age: 61
End: 2021-09-21

## 2021-09-21 PROCEDURE — 92012 INTRM OPH EXAM EST PATIENT: CPT

## 2021-10-12 ENCOUNTER — NON-APPOINTMENT (OUTPATIENT)
Age: 61
End: 2021-10-12

## 2021-10-12 ENCOUNTER — APPOINTMENT (OUTPATIENT)
Dept: OPHTHALMOLOGY | Facility: CLINIC | Age: 61
End: 2021-10-12
Payer: COMMERCIAL

## 2021-10-12 PROCEDURE — 92012 INTRM OPH EXAM EST PATIENT: CPT

## 2021-11-23 ENCOUNTER — APPOINTMENT (OUTPATIENT)
Dept: OPHTHALMOLOGY | Facility: CLINIC | Age: 61
End: 2021-11-23
Payer: COMMERCIAL

## 2021-11-23 ENCOUNTER — NON-APPOINTMENT (OUTPATIENT)
Age: 61
End: 2021-11-23

## 2021-11-23 PROCEDURE — 92012 INTRM OPH EXAM EST PATIENT: CPT

## 2021-12-29 ENCOUNTER — NON-APPOINTMENT (OUTPATIENT)
Age: 61
End: 2021-12-29

## 2022-01-04 ENCOUNTER — NON-APPOINTMENT (OUTPATIENT)
Age: 62
End: 2022-01-04

## 2022-01-04 ENCOUNTER — APPOINTMENT (OUTPATIENT)
Dept: OPHTHALMOLOGY | Facility: CLINIC | Age: 62
End: 2022-01-04
Payer: COMMERCIAL

## 2022-01-04 PROCEDURE — 92014 COMPRE OPH EXAM EST PT 1/>: CPT

## 2022-01-04 PROCEDURE — 92134 CPTRZ OPH DX IMG PST SGM RTA: CPT

## 2022-01-10 ENCOUNTER — LABORATORY RESULT (OUTPATIENT)
Age: 62
End: 2022-01-10

## 2022-01-10 ENCOUNTER — APPOINTMENT (OUTPATIENT)
Dept: UROLOGY | Facility: CLINIC | Age: 62
End: 2022-01-10
Payer: COMMERCIAL

## 2022-01-10 VITALS — DIASTOLIC BLOOD PRESSURE: 75 MMHG | HEART RATE: 93 BPM | SYSTOLIC BLOOD PRESSURE: 148 MMHG | TEMPERATURE: 97.6 F

## 2022-01-10 PROCEDURE — 51798 US URINE CAPACITY MEASURE: CPT

## 2022-01-10 PROCEDURE — 99214 OFFICE O/P EST MOD 30 MIN: CPT

## 2022-01-10 PROCEDURE — 51741 ELECTRO-UROFLOWMETRY FIRST: CPT

## 2022-01-10 NOTE — HISTORY OF PRESENT ILLNESS
[Nocturia] : nocturia [Erectile Dysfunction] : Erectile Dysfunction [FreeTextEntry1] : Urinary stream\par s/p urethral biopsy\par \par Infertility\par s/p varicocelecomy\par s/p clomid therapy\par \par 12.22.2017\par patient seen urgently in light of ? rise in PSA.\par patient continues on clomid\par PSA 3.3\par \par 7.20.2018\par PSA 1.9 (6/26/2018)\par T 515 \par free T 12.9\par estradiol 43.5\par FSH 33.6; LH 22.4\par cbc normal\par urine normal\par creatinine 1.9 after omaprazole starting seeing Milan Mcdonnell being evaluated\par USG is schedule\par \par 10.16.2018 UroLift\par \par 10.18.2018\par patient called yesterday\par complaining of spasm\par removed catheter at my direction\par urinating well with some urgency\par no pain, fevers chills etc \par  \par 12.3.2018\par Pleased with response to urolift\par \par  6.3.2019\par improved urinary symptoms after urolift\par still requires flomax\par no nocturia\par AUA sx 4\par YURIY 21 with cialis 20 mg\par \par 9.27.2019\par continues to be pleased with urinary symptoms\par continues on flomax\par creatine 1.3t\par Testosterone  \par Lh 6.9\par FSH 14.0\par \par free T 5.5 (7-24)\par estradiol 23.5\par continues on Cialis prn\par \par 11.4.2019\par on testim one packet daily\par \par 2.3.2020\par continues on testim\par energy improved\par AUA sx 5 fow\par sexual function doing well\par \par 8.10.2020\par improved on flomax and urolift\par pleased with symptoms\par seeing Dr Osuna re elevated creatinine\par has been of T \par sexual function is "good enough"\par energy low off of T; off since May 2020\par \par 12.07.2020\par on tamsulosin and urolift\par continues to be pleased \par improved after urolift\par \par 6.7.2021\par YURIY 24 usually does not need tadalafil\par IPSS 5 tamsulosin\par s.p urolift\par \par 1.10.2021\par urinary symptoms stable on flomax s/p urolift\par pleased \par erection satisfactory taking PD5 prn\par on 2 testosterone transdermal\par  [Urinary Incontinence] : no urinary incontinence [Urinary Retention] : no urinary retention [Urinary Urgency] : no urinary urgency [Urinary Frequency] : no urinary frequency

## 2022-01-10 NOTE — ASSESSMENT
[FreeTextEntry1] : Patient tolerating T transdermal\par Complaining of seasonal affective disorder\par Sexual function good; although breaking up from girlfriende\par Dr. Dudley wishes to continue on transdermal testosterone.\par Pleased with response to T \par will continue to resend\par \par Urinary symptoms stable after UROLIFT procedure.\par He is pleased with this treatment.\par feels better on Flomax\par will continue\par \par Will reassess:\par testosterone\par PSA\par LFTS\par H/HCt\par 6 months\par \par \par 8.10.2020\par patient pleased with UROLIFT and tamsulosin\par still feels that he requires tamsulosin\par flow today non diagnostic\par \par sexual function "satisfactory" off of T since May\par He feels less energetic and wishes to resume\par will repeat labs prior to resuming\par \par creatinine has normalized with cessation of cardiac medication\par \par will recheck PSA\par \par 12.07.2020\par # 1  creatinine stable ; bp management with Dr Osuna\par # 2  testosterone replacement - continues on testosterone\par # 3 LUTS on flomax \par # 4  PSA stable\par \par 6.7.2021\par Bun 18\par creat 1.29\par testosterone\par hemoglobin 14.2\par hematocrit  41.6\par testosterone\par FSH 11.9 \par LH 6.4\par Testosterone  365  free t 5.1\par PSA 1.8\par discussed increasing to 2 packets per day\par labs in 3 weeks\par \par patient concern that LUTS worse (flow noted)\par decreased stream more urgency and nocturia \par continues on tamsulosin\par interested in repeat Urolift\par will defer until after cataract\par \par 1.10.2022\par doing well \par on T replacement\par on flomax 0.4 mg\par pleased with symptoms\par discussed PVR and flpw MAX FLOW 4.7 and voided volume 75\par increase flomax to 2 tabs and assess impact on flow and PVR\par \par

## 2022-01-10 NOTE — PHYSICAL EXAM
[Bowel Sounds] : normal bowel sounds [Abdomen Soft] : soft [Abdomen Tenderness] : non-tender [Urethral Meatus] : meatus normal [Penis Abnormality] : normal circumcised penis [Epididymis] : the epididymides were normal [Testes Tenderness] : no tenderness of the testes [FreeTextEntry1] : atrophic bilaterally; prostate +2  ; smooth; PVR 89

## 2022-01-11 ENCOUNTER — TRANSCRIPTION ENCOUNTER (OUTPATIENT)
Age: 62
End: 2022-01-11

## 2022-01-18 ENCOUNTER — TRANSCRIPTION ENCOUNTER (OUTPATIENT)
Age: 62
End: 2022-01-18

## 2022-02-07 ENCOUNTER — APPOINTMENT (OUTPATIENT)
Dept: UROLOGY | Facility: CLINIC | Age: 62
End: 2022-02-07
Payer: COMMERCIAL

## 2022-02-07 VITALS — TEMPERATURE: 97.6 F

## 2022-02-07 DIAGNOSIS — R39.12 POOR URINARY STREAM: ICD-10-CM

## 2022-02-07 PROCEDURE — 99213 OFFICE O/P EST LOW 20 MIN: CPT

## 2022-02-07 NOTE — ASSESSMENT
[FreeTextEntry1] : Patient tolerating T transdermal\par Complaining of seasonal affective disorder\par Sexual function good; although breaking up from girlfriende\par Dr. Dudley wishes to continue on transdermal testosterone.\par Pleased with response to T \par will continue to resend\par \par Urinary symptoms stable after UROLIFT procedure.\par He is pleased with this treatment.\par feels better on Flomax\par will continue\par \par Will reassess:\par testosterone\par PSA\par LFTS\par H/HCt\par 6 months\par \par \par 8.10.2020\par patient pleased with UROLIFT and tamsulosin\par still feels that he requires tamsulosin\par flow today non diagnostic\par \par sexual function "satisfactory" off of T since May\par He feels less energetic and wishes to resume\par will repeat labs prior to resuming\par \par creatinine has normalized with cessation of cardiac medication\par \par will recheck PSA\par \par 12.07.2020\par # 1  creatinine stable ; bp management with Dr Osuna\par # 2  testosterone replacement - continues on testosterone\par # 3 LUTS on flomax \par # 4  PSA stable\par \par 6.7.2021\par Bun 18\par creat 1.29\par testosterone\par hemoglobin 14.2\par hematocrit  41.6\par testosterone\par FSH 11.9 \par LH 6.4\par Testosterone  365  free t 5.1\par PSA 1.8\par discussed increasing to 2 packets per day\par labs in 3 weeks\par \par patient concern that LUTS worse (flow noted)\par decreased stream more urgency and nocturia \par continues on tamsulosin\par interested in repeat Urolift\par will defer until after cataract\par \par 1.10.2022\par doing well \par on T replacement\par on flomax 0.4 mg\par pleased with symptoms\par discussed PVR and flpw MAX FLOW 4.7 and voided volume 75\par increase flomax to 2 tabs and assess impact on flow and PVR\par \par \par 2.7.2022\par increased to 2 flomax and then "forget" and returned to one tablet\par flow 14.4; voided volume 11.7; PVR 46\par plesaed with urinary symptoms\par energy is "low" seasonal affective \par sexaul function is good\par labs reviewed\par \par Plan:\par 1. Hbg/hct \par 2 PSA\par 3. Testosterone\par 4. fu in 6 months

## 2022-02-07 NOTE — HISTORY OF PRESENT ILLNESS
[FreeTextEntry1] : Urinary stream\par s/p urethral biopsy\par \par Infertility\par s/p varicocelecomy\par s/p clomid therapy\par \par 12.22.2017\par patient seen urgently in light of ? rise in PSA.\par patient continues on clomid\par PSA 3.3\par \par 7.20.2018\par PSA 1.9 (6/26/2018)\par T 515 \par free T 12.9\par estradiol 43.5\par FSH 33.6; LH 22.4\par cbc normal\par urine normal\par creatinine 1.9 after omaprazole starting seeing Milan Mcdonnell being evaluated\par [facility] is schedule\par \par 10.16.2018 UroLift\par \par 10.18.2018\par patient called yesterday\par complaining of spasm\par removed catheter at my direction\par urinating well with some urgency\par no pain, fevers chills etc \par  \par 12.3.2018\par Pleased with response to urolift\par \par  6.3.2019\par improved urinary symptoms after urolift\par still requires flomax\par no nocturia\par AUA sx 4\par YURIY 21 with cialis 20 mg\par \par 9.27.2019\par continues to be pleased with urinary symptoms\par continues on flomax\par creatine 1.3t\par Testosterone  \par Lh 6.9\par FSH 14.0\par \par free T 5.5 (7-24)\par estradiol 23.5\par continues on Cialis prn\par \par 11.4.2019\par on testim one packet daily\par \par 2.3.2020\par continues on testim\par energy improved\par AUA sx 5 fow\par sexual function doing well\par \par 8.10.2020\par improved on flomax and urolift\par pleased with symptoms\par seeing Dr Osuna re elevated creatinine\par has been of T \par sexual function is "good enough"\par energy low off of T; off since May 2020\par \par 12.07.2020\par on tamsulosin and urolift\par continues to be pleased \par improved after urolift\par \par 6.7.2021\par YURIY 24 usually does not need tadalafil\par IPSS 5 tamsulosin\par s.p urolift\par \par 1.10.2021\par urinary symptoms stable on flomax s/p urolift\par pleased \par erection satisfactory taking PD5 prn\par on 2 testosterone transdermal\par \par \par 2.7.2022

## 2022-09-07 ENCOUNTER — APPOINTMENT (OUTPATIENT)
Dept: OPHTHALMOLOGY | Facility: CLINIC | Age: 62
End: 2022-09-07

## 2022-09-07 ENCOUNTER — NON-APPOINTMENT (OUTPATIENT)
Age: 62
End: 2022-09-07

## 2022-09-07 PROCEDURE — 92014 COMPRE OPH EXAM EST PT 1/>: CPT

## 2022-09-07 PROCEDURE — 92250 FUNDUS PHOTOGRAPHY W/I&R: CPT

## 2022-09-12 ENCOUNTER — APPOINTMENT (OUTPATIENT)
Dept: UROLOGY | Facility: CLINIC | Age: 62
End: 2022-09-12

## 2022-09-12 VITALS
DIASTOLIC BLOOD PRESSURE: 82 MMHG | SYSTOLIC BLOOD PRESSURE: 146 MMHG | OXYGEN SATURATION: 98 % | TEMPERATURE: 98 F | HEART RATE: 99 BPM

## 2022-09-12 DIAGNOSIS — R35.0 FREQUENCY OF MICTURITION: ICD-10-CM

## 2022-09-12 PROCEDURE — 51798 US URINE CAPACITY MEASURE: CPT | Mod: 59

## 2022-09-12 PROCEDURE — 51741 ELECTRO-UROFLOWMETRY FIRST: CPT

## 2022-09-12 PROCEDURE — 99214 OFFICE O/P EST MOD 30 MIN: CPT | Mod: 25

## 2022-09-12 RX ORDER — ALFUZOSIN HYDROCHLORIDE 10 MG/1
10 TABLET, EXTENDED RELEASE ORAL
Qty: 30 | Refills: 0 | Status: ACTIVE | COMMUNITY
Start: 2022-09-12 | End: 1900-01-01

## 2022-09-12 NOTE — PHYSICAL EXAM
[Urethral Meatus] : meatus normal [Penis Abnormality] : normal circumcised penis [Epididymis] : the epididymides were normal [Testes Tenderness] : no tenderness of the testes [FreeTextEntry1] : atrophic bilaterally; prostate +2  ; smooth; PVR 68
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
## 2022-09-12 NOTE — ASSESSMENT
[FreeTextEntry1] : Patient tolerating T transdermal\par Complaining of seasonal affective disorder\par Sexual function good; although breaking up from girlfriende\par Dr. Dudley wishes to continue on transdermal testosterone.\par Pleased with response to T \par will continue to resend\par \par Urinary symptoms stable after UROLIFT procedure.\par He is pleased with this treatment.\par feels better on Flomax\par will continue\par \par Will reassess:\par testosterone\par PSA\par LFTS\par H/HCt\par 6 months\par \par \par 8.10.2020\par patient pleased with UROLIFT and tamsulosin\par still feels that he requires tamsulosin\par flow today non diagnostic\par \par sexual function "satisfactory" off of T since May\par He feels less energetic and wishes to resume\par will repeat labs prior to resuming\par \par creatinine has normalized with cessation of cardiac medication\par \par will recheck PSA\par \par 12.07.2020\par # 1  creatinine stable ; bp management with Dr Osuna\par # 2  testosterone replacement - continues on testosterone\par # 3 LUTS on flomax \par # 4  PSA stable\par \par 6.7.2021\par Bun 18\par creat 1.29\par testosterone\par hemoglobin 14.2\par hematocrit  41.6\par testosterone\par FSH 11.9 \par LH 6.4\par Testosterone  365  free t 5.1\par PSA 1.8\par discussed increasing to 2 packets per day\par labs in 3 weeks\par \par patient concern that LUTS worse (flow noted)\par decreased stream more urgency and nocturia \par continues on tamsulosin\par interested in repeat Urolift\par will defer until after cataract\par \par 1.10.2022\par doing well \par on T replacement\par on flomax 0.4 mg\par pleased with symptoms\par discussed PVR and flpw MAX FLOW 4.7 and voided volume 75\par increase flomax to 2 tabs and assess impact on flow and PVR\par \par \par 2.7.2022\par increased to 2 flomax and then "forget" and returned to one tablet\par flow 14.4; voided volume 11.7; PVR 46\par pleased with urinary symptoms\par energy is "low" seasonal affective \par sexual function is good\par labs reviewed\par \par Plan:\par 1. Hbg/hct \par 2 PSA\par 3. Testosterone\par 4. fu in 6 months\par \par \par 9.12.2022\par returns on Flomax 1 tablet\par concerned re volume of ejaculate\par was supposed to increase  but did not\par flow 12.0 \par voided 147\par PVR 68\par major complaint is decreased ejaculate\par will attempt alfuzosin\par continues on Testosterone 40.5/2.5gm\par energy improved; depression improved on Testosterone \par \par sexual dysfunction\par \par \par Plan:\par labs Dr Osuna later this week\par H/H \par PSA\par Creat\par LFTS\par alfuzosin 10 mg  qday; side effects; Pt understands that some of the most common side effect  of this medication include dizziness, dry mouth, fatigue, lightheadedness, palpitations. Pt informed to stop the medication should any of these occur.  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

## 2023-01-03 ENCOUNTER — APPOINTMENT (OUTPATIENT)
Dept: OPHTHALMOLOGY | Facility: CLINIC | Age: 63
End: 2023-01-03
Payer: COMMERCIAL

## 2023-01-03 ENCOUNTER — NON-APPOINTMENT (OUTPATIENT)
Age: 63
End: 2023-01-03

## 2023-01-03 PROCEDURE — 92014 COMPRE OPH EXAM EST PT 1/>: CPT

## 2023-01-03 PROCEDURE — 92134 CPTRZ OPH DX IMG PST SGM RTA: CPT

## 2023-01-03 PROCEDURE — 92201 OPSCPY EXTND RTA DRAW UNI/BI: CPT

## 2023-01-17 ENCOUNTER — NON-APPOINTMENT (OUTPATIENT)
Age: 63
End: 2023-01-17

## 2023-01-17 ENCOUNTER — APPOINTMENT (OUTPATIENT)
Dept: OPHTHALMOLOGY | Facility: CLINIC | Age: 63
End: 2023-01-17
Payer: COMMERCIAL

## 2023-01-17 PROCEDURE — 92250 FUNDUS PHOTOGRAPHY W/I&R: CPT

## 2023-01-17 PROCEDURE — 92012 INTRM OPH EXAM EST PATIENT: CPT

## 2023-01-17 PROCEDURE — 92083 EXTENDED VISUAL FIELD XM: CPT

## 2023-01-25 ENCOUNTER — APPOINTMENT (OUTPATIENT)
Dept: OPHTHALMOLOGY | Facility: CLINIC | Age: 63
End: 2023-01-25

## 2023-01-31 ENCOUNTER — NON-APPOINTMENT (OUTPATIENT)
Age: 63
End: 2023-01-31

## 2023-01-31 ENCOUNTER — APPOINTMENT (OUTPATIENT)
Dept: OPHTHALMOLOGY | Facility: CLINIC | Age: 63
End: 2023-01-31
Payer: COMMERCIAL

## 2023-01-31 PROCEDURE — 92012 INTRM OPH EXAM EST PATIENT: CPT

## 2023-02-01 ENCOUNTER — NON-APPOINTMENT (OUTPATIENT)
Age: 63
End: 2023-02-01

## 2023-02-01 ENCOUNTER — APPOINTMENT (OUTPATIENT)
Dept: OPHTHALMOLOGY | Facility: CLINIC | Age: 63
End: 2023-02-01
Payer: COMMERCIAL

## 2023-02-01 PROCEDURE — 76513 OPH US DX ANT SGM US UNI/BI: CPT | Mod: RT

## 2023-02-01 PROCEDURE — 92012 INTRM OPH EXAM EST PATIENT: CPT

## 2023-02-07 ENCOUNTER — NON-APPOINTMENT (OUTPATIENT)
Age: 63
End: 2023-02-07

## 2023-02-07 ENCOUNTER — APPOINTMENT (OUTPATIENT)
Dept: OPHTHALMOLOGY | Facility: CLINIC | Age: 63
End: 2023-02-07
Payer: COMMERCIAL

## 2023-02-07 PROCEDURE — 92012 INTRM OPH EXAM EST PATIENT: CPT

## 2023-02-08 ENCOUNTER — APPOINTMENT (OUTPATIENT)
Dept: OPHTHALMOLOGY | Facility: CLINIC | Age: 63
End: 2023-02-08
Payer: COMMERCIAL

## 2023-02-08 ENCOUNTER — NON-APPOINTMENT (OUTPATIENT)
Age: 63
End: 2023-02-08

## 2023-02-08 PROCEDURE — 66761 REVISION OF IRIS: CPT | Mod: RT

## 2023-02-13 ENCOUNTER — APPOINTMENT (OUTPATIENT)
Dept: OPHTHALMOLOGY | Facility: CLINIC | Age: 63
End: 2023-02-13
Payer: COMMERCIAL

## 2023-02-13 ENCOUNTER — NON-APPOINTMENT (OUTPATIENT)
Age: 63
End: 2023-02-13

## 2023-02-13 PROCEDURE — 99024 POSTOP FOLLOW-UP VISIT: CPT

## 2023-03-14 ENCOUNTER — NON-APPOINTMENT (OUTPATIENT)
Age: 63
End: 2023-03-14

## 2023-03-14 ENCOUNTER — APPOINTMENT (OUTPATIENT)
Dept: OPHTHALMOLOGY | Facility: CLINIC | Age: 63
End: 2023-03-14
Payer: COMMERCIAL

## 2023-03-14 PROCEDURE — 92012 INTRM OPH EXAM EST PATIENT: CPT

## 2023-03-21 ENCOUNTER — NON-APPOINTMENT (OUTPATIENT)
Age: 63
End: 2023-03-21

## 2023-03-21 ENCOUNTER — APPOINTMENT (OUTPATIENT)
Dept: OPHTHALMOLOGY | Facility: CLINIC | Age: 63
End: 2023-03-21
Payer: COMMERCIAL

## 2023-03-21 PROCEDURE — 92012 INTRM OPH EXAM EST PATIENT: CPT

## 2023-03-28 ENCOUNTER — NON-APPOINTMENT (OUTPATIENT)
Age: 63
End: 2023-03-28

## 2023-03-28 ENCOUNTER — APPOINTMENT (OUTPATIENT)
Dept: OPHTHALMOLOGY | Facility: CLINIC | Age: 63
End: 2023-03-28
Payer: COMMERCIAL

## 2023-03-28 PROCEDURE — 92012 INTRM OPH EXAM EST PATIENT: CPT

## 2023-04-03 ENCOUNTER — APPOINTMENT (OUTPATIENT)
Dept: UROLOGY | Facility: CLINIC | Age: 63
End: 2023-04-03
Payer: COMMERCIAL

## 2023-04-03 PROCEDURE — 51798 US URINE CAPACITY MEASURE: CPT

## 2023-04-03 PROCEDURE — 99214 OFFICE O/P EST MOD 30 MIN: CPT | Mod: 25

## 2023-04-03 PROCEDURE — 51741 ELECTRO-UROFLOWMETRY FIRST: CPT
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
## 2023-04-03 NOTE — PHYSICAL EXAM
[Urethral Meatus] : meatus normal [Penis Abnormality] : normal circumcised penis [Epididymis] : the epididymides were normal [Testes Tenderness] : no tenderness of the testes [Prostate Enlargement] : the prostate was not enlarged [Prostate Tenderness] : the prostate was not tender [FreeTextEntry1] : flow 16.3; average 9; volume 205; ;  repeat PVR 77

## 2023-04-03 NOTE — ASSESSMENT
[FreeTextEntry1] : Patient tolerating T transdermal\par Complaining of seasonal affective disorder\par Sexual function good; although breaking up from girlfriende\par Dr. Dudley wishes to continue on transdermal testosterone.\par Pleased with response to T \par will continue to resend\par \par Urinary symptoms stable after UROLIFT procedure.\par He is pleased with this treatment.\par feels better on Flomax\par will continue\par \par Will reassess:\par testosterone\par PSA\par LFTS\par H/HCt\par 6 months\par \par \par 8.10.2020\par patient pleased with UROLIFT and tamsulosin\par still feels that he requires tamsulosin\par flow today non diagnostic\par \par sexual function "satisfactory" off of T since May\par He feels less energetic and wishes to resume\par will repeat labs prior to resuming\par \par creatinine has normalized with cessation of cardiac medication\par \par will recheck PSA\par \par 12.07.2020\par # 1  creatinine stable ; bp management with Dr Osuna\par # 2  testosterone replacement - continues on testosterone\par # 3 LUTS on flomax \par # 4  PSA stable\par \par 6.7.2021\par Bun 18\par creat 1.29\par testosterone\par hemoglobin 14.2\par hematocrit  41.6\par testosterone\par FSH 11.9 \par LH 6.4\par Testosterone  365  free t 5.1\par PSA 1.8\par discussed increasing to 2 packets per day\par labs in 3 weeks\par \par patient concern that LUTS worse (flow noted)\par decreased stream more urgency and nocturia \par continues on tamsulosin\par interested in repeat Urolift\par will defer until after cataract\par \par 1.10.2022\par doing well \par on T replacement\par on flomax 0.4 mg\par pleased with symptoms\par discussed PVR and flpw MAX FLOW 4.7 and voided volume 75\par increase flomax to 2 tabs and assess impact on flow and PVR\par \par \par 2.7.2022\par increased to 2 flomax and then "forget" and returned to one tablet\par flow 14.4; voided volume 11.7; PVR 46\par pleased with urinary symptoms\par energy is "low" seasonal affective \par sexual function is good\par labs reviewed\par \par Plan:\par 1. Hbg/hct \par 2 PSA\par 3. Testosterone\par 4. fu in 6 months\par \par \par 9.12.2022\par returns on Flomax 1 tablet\par concerned re volume of ejaculate\par was supposed to increase  but did not\par flow 12.0 \par voided 147\par PVR 68\par major complaint is decreased ejaculate\par will attempt alfuzosin\par continues on Testosterone 40.5/2.5gm\par energy improved; depression improved on Testosterone \par \par sexual dysfunction\par \par \par Plan:\par labs Dr Osuna later this week\par H/H \par PSA\par Creat\par LFTS\par alfuzosin 10 mg  qday; side effects; Pt understands that some of the most common side effect  of this medication include dizziness, dry mouth, fatigue, lightheadedness, palpitations. Pt informed to stop the medication should any of these occur.  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  He he restarted testosterone transdermally.  He has improved energy and sexual function.  He does not have a partner.  He states his erections are normal.\par \par He is concerned regarding his urinary symptoms.  His father recently was hospitalized with urosepsis in [place].  He is status post UroLift.  His maximum flow rate is 16.3 cc/s with a voided volume of 205 cc.  His postvoid residual was 299.  He then voided again.\par We discussed various treatments for bladder outlet obstruction including Rezum TURP and prostate artery embolization.  He has risk averse.\par \par Patient will get labs in  Dr Osuna\par \par Plan:\par T\par PSA\par Hbg/Hct\par followup in 6 months\par reports normal results at last visit\par \par \par

## 2023-04-24 ENCOUNTER — NON-APPOINTMENT (OUTPATIENT)
Age: 63
End: 2023-04-24

## 2023-04-24 ENCOUNTER — APPOINTMENT (OUTPATIENT)
Dept: OPHTHALMOLOGY | Facility: CLINIC | Age: 63
End: 2023-04-24
Payer: COMMERCIAL

## 2023-04-24 PROCEDURE — 92014 COMPRE OPH EXAM EST PT 1/>: CPT

## 2023-04-24 PROCEDURE — 92250 FUNDUS PHOTOGRAPHY W/I&R: CPT

## 2023-04-24 PROCEDURE — 92020 GONIOSCOPY: CPT

## 2023-07-11 ENCOUNTER — NON-APPOINTMENT (OUTPATIENT)
Age: 63
End: 2023-07-11

## 2023-07-11 ENCOUNTER — APPOINTMENT (OUTPATIENT)
Dept: OPHTHALMOLOGY | Facility: CLINIC | Age: 63
End: 2023-07-11
Payer: COMMERCIAL

## 2023-07-11 PROCEDURE — 92015 DETERMINE REFRACTIVE STATE: CPT

## 2023-07-11 PROCEDURE — 92012 INTRM OPH EXAM EST PATIENT: CPT

## 2023-09-13 ENCOUNTER — NON-APPOINTMENT (OUTPATIENT)
Age: 63
End: 2023-09-13

## 2023-09-13 ENCOUNTER — APPOINTMENT (OUTPATIENT)
Dept: OPHTHALMOLOGY | Facility: CLINIC | Age: 63
End: 2023-09-13
Payer: COMMERCIAL

## 2023-09-13 PROCEDURE — 92012 INTRM OPH EXAM EST PATIENT: CPT

## 2023-09-13 PROCEDURE — 92083 EXTENDED VISUAL FIELD XM: CPT

## 2023-09-13 PROCEDURE — 76513 OPH US DX ANT SGM US UNI/BI: CPT | Mod: RT

## 2023-10-02 ENCOUNTER — APPOINTMENT (OUTPATIENT)
Dept: UROLOGY | Facility: CLINIC | Age: 63
End: 2023-10-02
Payer: COMMERCIAL

## 2023-10-02 VITALS
HEART RATE: 102 BPM | TEMPERATURE: 98 F | DIASTOLIC BLOOD PRESSURE: 80 MMHG | HEIGHT: 71 IN | OXYGEN SATURATION: 99 % | SYSTOLIC BLOOD PRESSURE: 144 MMHG

## 2023-10-02 DIAGNOSIS — R79.89 OTHER SPECIFIED ABNORMAL FINDINGS OF BLOOD CHEMISTRY: ICD-10-CM

## 2023-10-02 DIAGNOSIS — R37 SEXUAL DYSFUNCTION, UNSPECIFIED: ICD-10-CM

## 2023-10-02 DIAGNOSIS — R33.9 RETENTION OF URINE, UNSPECIFIED: ICD-10-CM

## 2023-10-02 PROCEDURE — 99214 OFFICE O/P EST MOD 30 MIN: CPT

## 2023-10-04 ENCOUNTER — NON-APPOINTMENT (OUTPATIENT)
Age: 63
End: 2023-10-04

## 2023-10-09 ENCOUNTER — TRANSCRIPTION ENCOUNTER (OUTPATIENT)
Age: 63
End: 2023-10-09

## 2023-10-10 ENCOUNTER — APPOINTMENT (OUTPATIENT)
Dept: OPHTHALMOLOGY | Facility: CLINIC | Age: 63
End: 2023-10-10
Payer: COMMERCIAL

## 2023-10-10 ENCOUNTER — NON-APPOINTMENT (OUTPATIENT)
Age: 63
End: 2023-10-10

## 2023-10-10 PROCEDURE — 92014 COMPRE OPH EXAM EST PT 1/>: CPT

## 2023-10-10 PROCEDURE — 92250 FUNDUS PHOTOGRAPHY W/I&R: CPT

## 2023-10-10 PROCEDURE — 76512 OPH US DX B-SCAN: CPT | Mod: LT

## 2023-11-27 ENCOUNTER — APPOINTMENT (OUTPATIENT)
Dept: UROLOGY | Facility: CLINIC | Age: 63
End: 2023-11-27
Payer: COMMERCIAL

## 2023-11-27 DIAGNOSIS — Z87.898 PERSONAL HISTORY OF OTHER SPECIFIED CONDITIONS: ICD-10-CM

## 2023-11-27 PROCEDURE — 51798 US URINE CAPACITY MEASURE: CPT

## 2023-11-27 PROCEDURE — 51741 ELECTRO-UROFLOWMETRY FIRST: CPT

## 2023-11-27 PROCEDURE — 99213 OFFICE O/P EST LOW 20 MIN: CPT | Mod: 25

## 2023-11-27 RX ORDER — TESTOSTERONE 40.5 MG/2.5G
40.5 MG/2.5GM GEL TOPICAL
Qty: 450 | Refills: 0 | Status: ACTIVE | COMMUNITY
Start: 2019-09-27 | End: 1900-01-01

## 2024-01-03 ENCOUNTER — NON-APPOINTMENT (OUTPATIENT)
Age: 64
End: 2024-01-03

## 2024-01-26 RX ORDER — TESTOSTERONE UNDECANOATE 250 MG/ML
750 INJECTION INTRAMUSCULAR
Qty: 1 | Refills: 0 | Status: ACTIVE | COMMUNITY
Start: 2023-10-02 | End: 1900-01-01

## 2024-02-06 ENCOUNTER — NON-APPOINTMENT (OUTPATIENT)
Age: 64
End: 2024-02-06

## 2024-02-08 ENCOUNTER — APPOINTMENT (OUTPATIENT)
Dept: UROLOGY | Facility: CLINIC | Age: 64
End: 2024-02-08
Payer: COMMERCIAL

## 2024-02-08 DIAGNOSIS — R35.1 NOCTURIA: ICD-10-CM

## 2024-02-08 DIAGNOSIS — N46.9 MALE INFERTILITY, UNSPECIFIED: ICD-10-CM

## 2024-02-08 DIAGNOSIS — E29.1 TESTICULAR HYPOFUNCTION: ICD-10-CM

## 2024-02-08 PROCEDURE — 76857 US EXAM PELVIC LIMITED: CPT

## 2024-02-08 PROCEDURE — 99213 OFFICE O/P EST LOW 20 MIN: CPT
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
## 2024-02-08 NOTE — ASSESSMENT
[FreeTextEntry1] : Sexual dysfunction (302.70) (R37) Patient tolerating T transdermal Complaining of seasonal affective disorder Sexual function good; although breaking up from girlfriende Dr. Dudley wishes to continue on transdermal testosterone. Pleased with response to T will continue to resend  Urinary symptoms stable after UROLIFT procedure. He is pleased with this treatment. feels better on Flomax will continue  Will reassess: testosterone PSA LFTS H/HCt 6 months   8.10.2020 patient pleased with UROLIFT and tamsulosin still feels that he requires tamsulosin flow today non diagnostic  sexual function "satisfactory" off of T since May He feels less energetic and wishes to resume will repeat labs prior to resuming  creatinine has normalized with cessation of cardiac medication  will recheck PSA  12.07.2020 # 1 creatinine stable ; bp management with Dr Osuna # 2 testosterone replacement - continues on testosterone # 3 LUTS on flomax # 4 PSA stable  6.7.2021 Bun 18 creat 1.29 testosterone hemoglobin 14.2 hematocrit 41.6 testosterone FSH 11.9 LH 6.4 Testosterone 365 free t 5.1 PSA 1.8 discussed increasing to 2 packets per day labs in 3 weeks  patient concern that LUTS worse (flow noted) decreased stream more urgency and nocturia continues on tamsulosin interested in repeat Urolift will defer until after cataract  1.10.2022 doing well on T replacement on flomax 0.4 mg pleased with symptoms discussed PVR and flpw MAX FLOW 4.7 and voided volume 75 increase flomax to 2 tabs and assess impact on flow and PVR   2.7.2022 increased to 2 flomax and then "forget" and returned to one tablet flow 14.4; voided volume 11.7; PVR 46 pleased with urinary symptoms energy is "low" seasonal affective sexual function is good labs reviewed  Plan: 1. Hbg/hct 2 PSA 3. Testosterone 4. fu in 6 months   9.12.2022 returns on Flomax 1 tablet concerned re volume of ejaculate was supposed to increase but did not flow 12.0 voided 147 PVR 68 major complaint is decreased ejaculate will attempt alfuzosin continues on Testosterone 40.5/2.5gm energy improved; depression improved on Testosterone  sexual dysfunction   Plan: labs Dr Osuna later this week H/H PSA Creat LFTS alfuzosin 10 mg qday; side effects; Pt understands that some of the most common side effect of this medication include dizziness, dry mouth, fatigue, lightheadedness, palpitations. Pt informed to stop the medication should any of these occur. Discussed "retrograde ejaculation" failure of emission from medication. 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  10.2.2023 LABS creat 1.2 GFR 68 Hbc 15.6/ Hct 44.7 T free 11.2  PSA 10.9 interes naya in Rezum discussed risks and complications and recover  T patient has been under reliable Different treatment approaches were discussed including IM, transdermal and Testopel AVEED We discussed advantages and disadvantages of each  Plan: 1. consider Rezum 2. consider T replacement with IM or AVEED 3. repeat labs 4. continue on flomax 5. labs.        Plan Frequent urination, Hypogonadism in male Follow-up visit in 1 month Outpatient  flow and pvr  Status: Hold For - Scheduling Requested for: 02Nov2023 Hypogonadism in male Start: Aveed 750 MG/3ML Intramuscular Solution; INJECT 750 MG IM ON DAY 1, FOLLOWED BY A 2ND INJECTION 4 WEEKS LATER.`SUBSEQUENT INJECTIONS ARE GIVEN EVERY 10 WEEKS MDD:3 mL     11.27.2023 patient returns  unable to get AVEED  insurance rejected no recent T levels patient had T 239.6 (10.24.2023) crea5 1.27 PSA 1.7Hbg 15.7 Hct 46.1   Flow 17.4 cc /sec volume 128.8 PVR 90  Plan  resubmit AVEED for approval discussed Rezum; patient wants to get off alpha blockers discussed recovery patient wants to defer prior cystoscopy demonstrates small middle lobe discussed pelviv usg re prostate size Plan: 1. renewal T 2. resubmit AVEED 3. Pelvic ultrasound prior to rezum   2.8.2024 returns re LUTS prostate volume 88 cc prostate images demonstrated probable small middle lobe options: 1. PAE 2  Holup 3. Aqua ablation 4. Rezum risks and alternative patient concerned mostly with ejaculation  discussed risks of retrograde ejaculation  with Rozum  T testosterone awaiting AVEED approval 2 packets  40.5/2.5 gm (1.62%) wiill repeat T off of androgel next week with routine labs re approval

## 2024-03-08 ENCOUNTER — TRANSCRIPTION ENCOUNTER (OUTPATIENT)
Age: 64
End: 2024-03-08

## 2024-03-13 ENCOUNTER — APPOINTMENT (OUTPATIENT)
Dept: OPHTHALMOLOGY | Facility: CLINIC | Age: 64
End: 2024-03-13
Payer: COMMERCIAL

## 2024-03-13 ENCOUNTER — NON-APPOINTMENT (OUTPATIENT)
Age: 64
End: 2024-03-13

## 2024-03-13 PROCEDURE — 92083 EXTENDED VISUAL FIELD XM: CPT

## 2024-03-13 PROCEDURE — 92012 INTRM OPH EXAM EST PATIENT: CPT

## 2024-03-13 PROCEDURE — 92133 CPTRZD OPH DX IMG PST SGM ON: CPT

## 2024-08-06 ENCOUNTER — APPOINTMENT (OUTPATIENT)
Dept: UROLOGY | Facility: CLINIC | Age: 64
End: 2024-08-06

## 2024-08-06 PROBLEM — N39.0 URINARY TRACT INFECTION WITHOUT HEMATURIA, SITE UNSPECIFIED: Status: ACTIVE | Noted: 2024-08-06 | Resolved: 2024-09-05

## 2024-08-06 PROCEDURE — G2211 COMPLEX E/M VISIT ADD ON: CPT | Mod: NC

## 2024-08-06 PROCEDURE — 99214 OFFICE O/P EST MOD 30 MIN: CPT
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
## 2024-08-06 NOTE — ASSESSMENT
[FreeTextEntry1] : Sexual dysfunction (302.70) (R37) Patient tolerating T transdermal Complaining of seasonal affective disorder Sexual function good; although breaking up from girlfriende Dr. Dudley wishes to continue on transdermal testosterone. Pleased with response to T will continue to resend  Urinary symptoms stable after UROLIFT procedure. He is pleased with this treatment. feels better on Flomax will continue  Will reassess: testosterone PSA LFTS H/HCt 6 months   8.10.2020 patient pleased with UROLIFT and tamsulosin still feels that he requires tamsulosin flow today non diagnostic  sexual function "satisfactory" off of T since May He feels less energetic and wishes to resume will repeat labs prior to resuming  creatinine has normalized with cessation of cardiac medication  will recheck PSA  2020 # 1 creatinine stable ; bp management with Dr Osuna # 2 testosterone replacement - continues on testosterone # 3 LUTS on flomax # 4 PSA stable  6.7. Bun 18 creat 1.29 testosterone hemoglobin 14.2 hematocrit 41.6 testosterone FSH 11.9 LH 6.4 Testosterone 365 free t 5.1 PSA 1.8 discussed increasing to 2 packets per day labs in 3 weeks  patient concern that LUTS worse (flow noted) decreased stream more urgency and nocturia continues on tamsulosin interested in repeat Urolift will defer until after cataract  1.10.2022 doing well on T replacement on flomax 0.4 mg pleased with symptoms discussed PVR and flpw MAX FLOW 4.7 and voided volume 75 increase flomax to 2 tabs and assess impact on flow and PVR   2.7. increased to 2 flomax and then "forget" and returned to one tablet flow 14.4; voided volume 11.7; PVR 46 pleased with urinary symptoms energy is "low" seasonal affective sexual function is good labs reviewed  Plan: 1. Hbg/hct 2 PSA 3. Testosterone 4. fu in 6 months   2022 returns on Flomax 1 tablet concerned re volume of ejaculate was supposed to increase but did not flow 12.0 voided 147 PVR 68 major complaint is decreased ejaculate will attempt alfuzosin continues on Testosterone 40.5/2.5gm energy improved; depression improved on Testosterone  sexual dysfunction   Plan: labs Dr Osuna later this week H/H PSA Creat LFTS alfuzosin 10 mg qday; side effects; Pt understands that some of the most common side effect of this medication include dizziness, dry mouth, fatigue, lightheadedness, palpitations. Pt informed to stop the medication should any of these occur. Discussed "retrograde ejaculation" failure of emission from medication. 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  10.2.2023 LABS creat 1.2 GFR 68 Hbc 15.6/ Hct 44.7 T free 11.2  PSA 10.9 interes naya in Rezum discussed risks and complications and recover  T patient has been under reliable Different treatment approaches were discussed including IM, transdermal and Testopel AVEED We discussed advantages and disadvantages of each  Plan: 1. consider Rezum 2. consider T replacement with IM or AVEED 3. repeat labs 4. continue on flomax 5. labs.        Plan Frequent urination, Hypogonadism in male Follow-up visit in 1 month Outpatient  flow and pvr  Status: Hold For - Scheduling Requested for: 2023 Hypogonadism in male Start: Aveed 750 MG/3ML Intramuscular Solution; INJECT 750 MG IM ON DAY 1, FOLLOWED BY A 2ND INJECTION 4 WEEKS LATER.`SUBSEQUENT INJECTIONS ARE GIVEN EVERY 10 WEEKS MDD:3 mL     2023 patient returns  unable to get AVEED  insurance rejected no recent T levels patient had T 239.6 (10.24.2023) crea5 1.27 PSA 1.7Hbg 15.7 Hct 46.1   Flow 17.4 cc /sec volume 128.8 PVR 90  Plan  resubmit AVEED for approval discussed Rezum; patient wants to get off alpha blockers discussed recovery patient wants to defer prior cystoscopy demonstrates small middle lobe discussed pelviv usg re prostate size Plan: 1. renewal T 2. resubmit AVEED 3. Pelvic ultrasound prior to rezum   2 returns re LUTS prostate volume 88 cc prostate images demonstrated probable small middle lobe options: 1. PAE 2  [place] 3. Aqua ablation 4. Rezum risks and alternative patient concerned mostly with ejaculation  discussed risks of retrograde ejaculation  with Rozum  T testosterone awaiting AVEED approval 2 packets  40.5/2.5 gm (1.62%) wiill repeat T off of androgel next week with routine labs re approval   2024 Patient returns regardin.  Questionable UTI 2.  Urinary frequency and nocturia 3.  Hypogonadism.  Patient was recently told that he had a Enterococcus UTI by his primary care doctor.  It was not treated.  Patient is complaining of urinary urgency and frequency.  He had improvement after the UroLift but has not been able to be off of alpha blockers.  He is interested in further intervention.  We discussed treating his UTI if this is again documented.  Urinalysis and urine culture will be obtained.  We also discussed the management of his lower urinary tract symptoms.  He wishes to undergo intervention.  We discussed UroLift versus Rezum versus prostate artery embolization.  He is markedly anxious about his ejaculatory dysfunction and does not wish to risk this.  He is therefore interested in prostate artery embolization.  Additionally we discussed replacement of his testosterone.  He was initially interested in Aveed.  He now wishes to resume AndroGel as he has been doing previously.  Plan: 1.  Urinalysis 2.  Urine culture 3.  PSA 4.  Resume testosterone 40.5mg/2.5gm 5.  Referral to Dr. Anup Mallory to discuss prostate artery embolization for LUTS The NIGEL DUDLEY  expressed fully understanding of the information provided, the consequences and the management.

## 2024-08-07 ENCOUNTER — TRANSCRIPTION ENCOUNTER (OUTPATIENT)
Age: 64
End: 2024-08-07

## 2024-08-08 ENCOUNTER — TRANSCRIPTION ENCOUNTER (OUTPATIENT)
Age: 64
End: 2024-08-08

## 2024-08-14 ENCOUNTER — APPOINTMENT (OUTPATIENT)
Dept: UROLOGY | Facility: CLINIC | Age: 64
End: 2024-08-14
Payer: COMMERCIAL

## 2024-08-14 DIAGNOSIS — N40.1 BENIGN PROSTATIC HYPERPLASIA WITH LOWER URINARY TRACT SYMPMS: ICD-10-CM

## 2024-08-14 PROCEDURE — 99442: CPT | Mod: 93

## 2024-08-14 NOTE — HISTORY OF PRESENT ILLNESS
[Home] : at home, [unfilled] , at the time of the visit. [Verbal consent obtained from patient] : the patient, [unfilled] [FreeTextEntry1] :  Dear Dr. Jaimes,   Thank you so much for the referral to help care for your patient.  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 Prostate volume 88cc, probable small middle lobe. Patient concerned with ejaculation.  Patient is currently taking 0.4mg tamsulosin. Patient is prostate MRI and biopsy naive. Denies family hx of kidney, bladder, or prostate cancers.    Patient is a neuroradiology specialist.   SxHx:  left varicocelectomy  Allergies:  NKDA   PSA Hx:   2.25 8/7/24 1.90 2/20/24 1.90 4/19/23 1.90 1/10/22  1.64 12/7/20 1.37 8/10/20   The patient denies fevers, chills, nausea and or vomiting and no unexplained weight loss.  All pertinent laboratory, films and physician notes were reviewed. Questionnaire results were discussed with patient.

## 2024-08-14 NOTE — ASSESSMENT
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 Prostate volume 88cc, probable small middle lobe. Patient concerned with ejaculation.  1. MRI prostate - assess anatomy/size/shape of gland 2. Book for PAE- approach (TR vs TF) to be determined at follow up visit. Does not use assistive device/rollator walker and no hx of CVA 3. medical clearance, labs, EKG  4. Consider postop steroids given renal insufficiency and prefers not to use ibuprofen 5. Continue 0.4mg Flomax   Prostate MRI  MRI review with Dr. Mallory.   Thank you very much for allowing me to assist in the care of this patient. Please do not hesitate to contact me with any additional questions or concerns.

## 2024-08-21 ENCOUNTER — APPOINTMENT (OUTPATIENT)
Dept: MRI IMAGING | Facility: CLINIC | Age: 64
End: 2024-08-21
Payer: COMMERCIAL

## 2024-08-21 ENCOUNTER — RESULT REVIEW (OUTPATIENT)
Age: 64
End: 2024-08-21

## 2024-08-21 ENCOUNTER — OUTPATIENT (OUTPATIENT)
Dept: OUTPATIENT SERVICES | Facility: HOSPITAL | Age: 64
LOS: 1 days | End: 2024-08-21

## 2024-08-21 PROCEDURE — 72197 MRI PELVIS W/O & W/DYE: CPT | Mod: 26

## 2024-08-21 PROCEDURE — 76498P: CUSTOM | Mod: 26

## 2024-08-22 ENCOUNTER — NON-APPOINTMENT (OUTPATIENT)
Age: 64
End: 2024-08-22

## 2024-08-22 ENCOUNTER — APPOINTMENT (OUTPATIENT)
Dept: OPHTHALMOLOGY | Facility: CLINIC | Age: 64
End: 2024-08-22
Payer: COMMERCIAL

## 2024-08-22 PROCEDURE — 92083 EXTENDED VISUAL FIELD XM: CPT

## 2024-08-22 PROCEDURE — 92014 COMPRE OPH EXAM EST PT 1/>: CPT

## 2024-08-22 PROCEDURE — 92020 GONIOSCOPY: CPT

## 2024-08-22 PROCEDURE — 92250 FUNDUS PHOTOGRAPHY W/I&R: CPT

## 2024-08-28 PROBLEM — R93.89 ABNORMAL MRI: Status: ACTIVE | Noted: 2024-08-28

## 2024-09-04 ENCOUNTER — APPOINTMENT (OUTPATIENT)
Dept: UROLOGY | Facility: CLINIC | Age: 64
End: 2024-09-04
Payer: COMMERCIAL

## 2024-09-04 VITALS
HEART RATE: 88 BPM | TEMPERATURE: 98.3 F | SYSTOLIC BLOOD PRESSURE: 113 MMHG | DIASTOLIC BLOOD PRESSURE: 71 MMHG | OXYGEN SATURATION: 98 %

## 2024-09-04 DIAGNOSIS — Z87.898 PERSONAL HISTORY OF OTHER SPECIFIED CONDITIONS: ICD-10-CM

## 2024-09-04 DIAGNOSIS — R93.89 ABNORMAL FINDINGS ON DIAGNOSTIC IMAGING OF OTHER SPECIFIED BODY STRUCTURES: ICD-10-CM

## 2024-09-04 DIAGNOSIS — R35.0 FREQUENCY OF MICTURITION: ICD-10-CM

## 2024-09-04 DIAGNOSIS — R79.89 OTHER SPECIFIED ABNORMAL FINDINGS OF BLOOD CHEMISTRY: ICD-10-CM

## 2024-09-04 PROCEDURE — 99204 OFFICE O/P NEW MOD 45 MIN: CPT

## 2024-09-04 NOTE — ASSESSMENT
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 Prostate volume 88cc, probable small middle lobe. Patient concerned with ejaculation., PSA 2.25, MRI PIRADS 4 lesion  PIRADS 4 lesion - Reffered by Dr Jaimes - MRI prostate -reviewed, main lesion not very worrisome (Tz), added 2 small areas for sampling noted above  BPH - Flomax - cystoscopy to evaluate if urolift stents are calcified/in bladder - will consider PAE if biopsy negative  IRB Notification  The patient has been made aware of the opportunity to participate in our MR US fusion guided biopsy trial testing the next generation biopsy technology. This is an IRB approved trial (IRB #). He is already being consented for a standard MR US fusion guided biopsy therefore there is no change in his clinical work flow. He has been given a copy of the consent to review before the biopsy date and given an opportunity to contact us with any further questions. He will be consented on the day of the procedure or electronically before the biopsy.  He understands that many men with prostate cancer will die with the disease rather than of it and we also discussed the results large multi-center American and  prostate cancer screening trials. He also understands that PSA in and of itself does not diagnose prostate cancer but only assesses risk to a certain degree. The patient understands that to definitively screen for prostate cancer, a biopsy is required and this procedure has risks, including bleeding, infection, ED and urinary retention. The patient opted to move forward with the biopsy.  The patient is aware to expect hematuria x 2 weeks and upto 4 weeks of hematospermia. There is a risk of infection albeit much lower than a transrectal approach. In some cases patients can experience erectile dysfunction but this is usually self limiting. Any fever/chills after the biopsy the patient is to contact the office and go to the ER for an immediate evaluation. He has been given paper instructions outlining these items - which includes medications to avoid prior to surgery.  1. CBC, BMP, PSA, UA UCx 2. Medical clearance 3. TP biopsy at [facility] 4. follow up 2 weeks after biopsy with his primary urologist or ourselves. 5. we will call with the path results once they are resulted.  Thank you very much for allowing me to assist in the care of this patient. Please do not hesitate to contact me with any additional questions or concerns.     Sincerely,     Anup Mallory D.O. Professor of Urology and Radiology  of Urology at [facility] Director for Prostate Cancer [address] [place] [address], 5th Floor [facility], [facility] Phone: [phone]  I was present with the Resident during the key portions of the history and exam. I discussed the case with the Resident and agree with the findings and plan as documented in the Resident/Fellow 's note, unless noted below.     very depressed and unknown if medication compliant

## 2024-09-04 NOTE — ASSESSMENT
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 Prostate volume 88cc, probable small middle lobe. Patient concerned with ejaculation., PSA 2.25, MRI PIRADS 4 lesion  PIRADS 4 lesion - Reffered by Dr Jaimes - MRI prostate -reviewed, main lesion not very worrisome (Tz), added 2 small areas for sampling noted above  BPH - Flomax - cystoscopy to evaluate if urolift stents are calcified/in bladder - will consider PAE if biopsy negative  IRB Notification  The patient has been made aware of the opportunity to participate in our MR US fusion guided biopsy trial testing the next generation biopsy technology. This is an IRB approved trial (IRB #). He is already being consented for a standard MR US fusion guided biopsy therefore there is no change in his clinical work flow. He has been given a copy of the consent to review before the biopsy date and given an opportunity to contact us with any further questions. He will be consented on the day of the procedure or electronically before the biopsy.  He understands that many men with prostate cancer will die with the disease rather than of it and we also discussed the results large multi-center American and  prostate cancer screening trials. He also understands that PSA in and of itself does not diagnose prostate cancer but only assesses risk to a certain degree. The patient understands that to definitively screen for prostate cancer, a biopsy is required and this procedure has risks, including bleeding, infection, ED and urinary retention. The patient opted to move forward with the biopsy.  The patient is aware to expect hematuria x 2 weeks and upto 4 weeks of hematospermia. There is a risk of infection albeit much lower than a transrectal approach. In some cases patients can experience erectile dysfunction but this is usually self limiting. Any fever/chills after the biopsy the patient is to contact the office and go to the ER for an immediate evaluation. He has been given paper instructions outlining these items - which includes medications to avoid prior to surgery.  1. CBC, BMP, PSA, UA UCx 2. Medical clearance 3. TP biopsy at [facility] 4. follow up 2 weeks after biopsy with his primary urologist or ourselves. 5. we will call with the path results once they are resulted.  Thank you very much for allowing me to assist in the care of this patient. Please do not hesitate to contact me with any additional questions or concerns.     Sincerely,     Anup Mallory D.O. Professor of Urology and Radiology  of Urology at [facility] Director for Prostate Cancer [address] [place] [address], 5th Floor [facility], [facility] Phone: [phone]  I was present with the Resident during the key portions of the history and exam. I discussed the case with the Resident and agree with the findings and plan as documented in the Resident/Fellow 's note, unless noted below.

## 2024-09-04 NOTE — PHYSICAL EXAM
[General Appearance - Well Developed] : well developed [Normal Appearance] : normal appearance [Abdomen Tenderness] : non-tender [Abdomen Mass (___ Cm)] : no abdominal mass palpated [Costovertebral Angle Tenderness] : no ~M costovertebral angle tenderness [Urethral Meatus] : meatus normal [Penis Abnormality] : normal uncircumcised penis [Testes Mass (___cm)] : there were no testicular masses [Prostate Enlargement] : the prostate was not enlarged [Prostate Tenderness] : the prostate was not tender [de-identified] : Scrotal warts

## 2024-09-04 NOTE — HISTORY OF PRESENT ILLNESS
[FreeTextEntry1] : Dear Dr. Jaimes,  Thank you so much for the referral to help care for your patient.  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 Prostate volume 88cc, probable small middle lobe. Patient concerned with ejaculation. Patient is currently taking 0.4mg tamsulosin. Patient is prostate MRI and biopsy naive. Denies family hx of kidney, bladder, or prostate cancers. 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  LUTS severe if off Flomax, nocturia 1-3 times, urgency is his main symptom, doesn't remember when last cystoscopy Patient had MRI for possible PAE planning, showed PIRADS 4 lesion, patient planned for biopsy with Dr Jaimes (October 2nd), is here to discuss possible focal therapy if it's positive and to discuss PAE if it's negative  SxHx: left varicocelectomy retinal detachment cataract  Allergies: NKDA  PSA Hx: 2.25 8/7/24 1.90 2/20/24 1.90 4/19/23 1.90 1/10/22 1.64 12/7/20 1.37 8/10/20  MRI Hx: MRI at [facility] on 08/21/2024. 76.6 cc prostate with PIRADS 4 lesion #1 measuring 20 x 25 x 20 mm in the Midline, to left posterior TZ at base.  No LAD No EPE, No Bony Lesions.  The images have been reviewed and clinical implications discussed with the patient. MRI reviewed Added left apex pzpl image 20 and left base pzpm.  The patient denies fevers, chills, nausea and or vomiting and no unexplained weight loss.  All pertinent laboratory, films and physician notes were reviewed. Questionnaire results were discussed with patient.  I spent 31 minutes of non-face-to-face time reviewing the patient's records chart, uploading processing MR images, transferring MR images from PACS to an independent workstation, reviewing images on an independent workstation, speaking with their physician and planning their prostate biopsy and preparation of an upcoming visit for 09/04/2024 .  The imaging and planning was highly complex and took this entire time.

## 2024-09-04 NOTE — PHYSICAL EXAM
[General Appearance - Well Developed] : well developed [Normal Appearance] : normal appearance [Abdomen Tenderness] : non-tender [Abdomen Mass (___ Cm)] : no abdominal mass palpated [Costovertebral Angle Tenderness] : no ~M costovertebral angle tenderness [Urethral Meatus] : meatus normal [Penis Abnormality] : normal uncircumcised penis [Testes Mass (___cm)] : there were no testicular masses [Prostate Enlargement] : the prostate was not enlarged [Prostate Tenderness] : the prostate was not tender [de-identified] : Scrotal warts

## 2024-09-04 NOTE — HISTORY OF PRESENT ILLNESS
[FreeTextEntry1] : Dear Dr. Jaimes,  Thank you so much for the referral to help care for your patient.  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 Prostate volume 88cc, probable small middle lobe. Patient concerned with ejaculation. Patient is currently taking 0.4mg tamsulosin. Patient is prostate MRI and biopsy naive. Denies family hx of kidney, bladder, or prostate cancers. 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  LUTS severe if off Flomax, nocturia 1-3 times, urgency is his main symptom, doesn't remember when last cystoscopy Patient had MRI for possible PAE planning, showed PIRADS 4 lesion, patient planned for biopsy with Dr Jaimes (October 2nd), is here to discuss possible focal therapy if it's positive and to discuss PAE if it's negative  SxHx: left varicocelectomy retinal detachment cataract  Allergies: NKDA  PSA Hx: 2.25 8/7/24 1.90 2/20/24 1.90 4/19/23 1.90 1/10/22 1.64 12/7/20 1.37 8/10/20  MRI Hx: MRI at [facility] on 08/21/2024. 76.6 cc prostate with PIRADS 4 lesion #1 measuring 20 x 25 x 20 mm in the Midline, to left posterior TZ at base.  No LAD No EPE, No Bony Lesions.  The images have been reviewed and clinical implications discussed with the patient. MRI reviewed Added left apex pzpl image 20 and left base pzpm.  The patient denies fevers, chills, nausea and or vomiting and no unexplained weight loss.  All pertinent laboratory, films and physician notes were reviewed. Questionnaire results were discussed with patient.  I spent 31 minutes of non-face-to-face time reviewing the patient's records chart, uploading processing MR images, transferring MR images from PACS to an independent workstation, reviewing images on an independent workstation, speaking with their physician and planning their prostate biopsy and preparation of an upcoming visit for 09/04/2024 .  The imaging and planning was highly complex and took this entire time.

## 2024-09-05 LAB
PSA FREE FLD-MCNC: 28 %
PSA FREE SERPL-MCNC: 0.61 NG/ML
PSA SERPL-MCNC: 2.16 NG/ML

## 2024-09-06 ENCOUNTER — TRANSCRIPTION ENCOUNTER (OUTPATIENT)
Age: 64
End: 2024-09-06

## 2024-09-06 LAB
ANION GAP SERPL CALC-SCNC: 17 MMOL/L
APPEARANCE: CLEAR
BASOPHILS # BLD AUTO: 0.03 K/UL
BASOPHILS NFR BLD AUTO: 0.4 %
BILIRUBIN URINE: NEGATIVE
BLOOD URINE: NEGATIVE
BUN SERPL-MCNC: 16 MG/DL
CALCIUM SERPL-MCNC: 10.3 MG/DL
CHLORIDE SERPL-SCNC: 102 MMOL/L
CO2 SERPL-SCNC: 24 MMOL/L
COLOR: YELLOW
CREAT SERPL-MCNC: 1.19 MG/DL
EGFR: 68 ML/MIN/1.73M2
EOSINOPHIL # BLD AUTO: 0.13 K/UL
EOSINOPHIL NFR BLD AUTO: 1.6 %
GLUCOSE QUALITATIVE U: NEGATIVE MG/DL
GLUCOSE SERPL-MCNC: 99 MG/DL
HCT VFR BLD CALC: 52.4 %
HGB BLD-MCNC: 16.4 G/DL
IMM GRANULOCYTES NFR BLD AUTO: 0.3 %
KETONES URINE: NEGATIVE MG/DL
LEUKOCYTE ESTERASE URINE: NEGATIVE
LYMPHOCYTES # BLD AUTO: 1.68 K/UL
LYMPHOCYTES NFR BLD AUTO: 21.1 %
MAN DIFF?: NORMAL
MCHC RBC-ENTMCNC: 29.6 PG
MCHC RBC-ENTMCNC: 31.3 GM/DL
MCV RBC AUTO: 94.6 FL
MONOCYTES # BLD AUTO: 0.8 K/UL
MONOCYTES NFR BLD AUTO: 10 %
NEUTROPHILS # BLD AUTO: 5.31 K/UL
NEUTROPHILS NFR BLD AUTO: 66.6 %
NITRITE URINE: NEGATIVE
PH URINE: 7
PLATELET # BLD AUTO: 206 K/UL
POTASSIUM SERPL-SCNC: 5.1 MMOL/L
PROTEIN URINE: NEGATIVE MG/DL
RBC # BLD: 5.54 M/UL
RBC # FLD: 13.9 %
SODIUM SERPL-SCNC: 142 MMOL/L
SPECIFIC GRAVITY URINE: 1.02
UROBILINOGEN URINE: 0.2 MG/DL
WBC # FLD AUTO: 7.97 K/UL

## 2024-09-09 ENCOUNTER — TRANSCRIPTION ENCOUNTER (OUTPATIENT)
Age: 64
End: 2024-09-09

## 2024-09-09 LAB — BACTERIA UR CULT: ABNORMAL

## 2024-09-09 RX ORDER — AMOXICILLIN AND CLAVULANATE POTASSIUM 875; 125 MG/1; MG/1
875-125 TABLET, COATED ORAL
Qty: 14 | Refills: 0 | Status: ACTIVE | COMMUNITY
Start: 2024-09-09 | End: 1900-01-01

## 2024-09-11 ENCOUNTER — OUTPATIENT (OUTPATIENT)
Dept: OUTPATIENT SERVICES | Facility: HOSPITAL | Age: 64
LOS: 1 days | End: 2024-09-11
Payer: COMMERCIAL

## 2024-09-11 DIAGNOSIS — R97.20 ELEVATED PROSTATE SPECIFIC ANTIGEN [PSA]: ICD-10-CM

## 2024-09-11 PROCEDURE — C8001: CPT

## 2024-09-17 ENCOUNTER — NON-APPOINTMENT (OUTPATIENT)
Age: 64
End: 2024-09-17

## 2024-09-23 ENCOUNTER — TRANSCRIPTION ENCOUNTER (OUTPATIENT)
Age: 64
End: 2024-09-23

## 2024-09-25 ENCOUNTER — TRANSCRIPTION ENCOUNTER (OUTPATIENT)
Age: 64
End: 2024-09-25

## 2024-09-25 NOTE — ASU PATIENT PROFILE, ADULT - NSICDXPASTSURGICALHX_GEN_ALL_CORE_FT
PAST SURGICAL HISTORY:  Fracture of left clavicle     History of repair of right rotator cuff     History of urologic surgery UroliftBi    Left varicocele     Right retinal detachment     S/P right cataract extraction     Status post bilateral hernia repair

## 2024-09-25 NOTE — ASU PATIENT PROFILE, ADULT - FALL HARM RISK - UNIVERSAL INTERVENTIONS
Bed in lowest position, wheels locked, appropriate side rails in place/Call bell, personal items and telephone in reach/Instruct patient to call for assistance before getting out of bed or chair/Non-slip footwear when patient is out of bed/[place] to call system/Physically safe environment - no spills, clutter or unnecessary equipment/Purposeful Proactive Rounding/Room/bathroom lighting operational, light cord in reach

## 2024-09-25 NOTE — ASU PATIENT PROFILE, ADULT - NSICDXPASTMEDICALHX_GEN_ALL_CORE_FT
PAST MEDICAL HISTORY:  GERD (gastroesophageal reflux disease)     Hiatal hernia     Hypercholesteremia     Hypertension     KRISTOFER on CPAP     Unspecified kidney failure

## 2024-09-26 ENCOUNTER — RESULT REVIEW (OUTPATIENT)
Age: 64
End: 2024-09-26

## 2024-09-26 ENCOUNTER — APPOINTMENT (OUTPATIENT)
Dept: UROLOGY | Facility: AMBULATORY SURGERY CENTER | Age: 64
End: 2024-09-26

## 2024-09-26 ENCOUNTER — TRANSCRIPTION ENCOUNTER (OUTPATIENT)
Age: 64
End: 2024-09-26

## 2024-09-26 ENCOUNTER — OUTPATIENT (OUTPATIENT)
Dept: OUTPATIENT SERVICES | Facility: HOSPITAL | Age: 64
LOS: 1 days | Discharge: HOME | End: 2024-09-26
Payer: COMMERCIAL

## 2024-09-26 VITALS
RESPIRATION RATE: 16 BRPM | TEMPERATURE: 99 F | DIASTOLIC BLOOD PRESSURE: 80 MMHG | HEART RATE: 81 BPM | WEIGHT: 191.8 LBS | HEIGHT: 77 IN | OXYGEN SATURATION: 98 % | SYSTOLIC BLOOD PRESSURE: 142 MMHG

## 2024-09-26 VITALS
RESPIRATION RATE: 16 BRPM | DIASTOLIC BLOOD PRESSURE: 69 MMHG | SYSTOLIC BLOOD PRESSURE: 125 MMHG | HEART RATE: 66 BPM | OXYGEN SATURATION: 97 % | TEMPERATURE: 98 F

## 2024-09-26 DIAGNOSIS — Z98.41 CATARACT EXTRACTION STATUS, RIGHT EYE: Chronic | ICD-10-CM

## 2024-09-26 DIAGNOSIS — Z98.890 OTHER SPECIFIED POSTPROCEDURAL STATES: Chronic | ICD-10-CM

## 2024-09-26 DIAGNOSIS — I86.1 SCROTAL VARICES: Chronic | ICD-10-CM

## 2024-09-26 DIAGNOSIS — H33.21 SEROUS RETINAL DETACHMENT, RIGHT EYE: Chronic | ICD-10-CM

## 2024-09-26 DIAGNOSIS — S42.002A FRACTURE OF UNSPECIFIED PART OF LEFT CLAVICLE, INITIAL ENCOUNTER FOR CLOSED FRACTURE: Chronic | ICD-10-CM

## 2024-09-26 PROCEDURE — ZZZZZ: CPT

## 2024-09-26 PROCEDURE — 76999F: CUSTOM | Mod: 26

## 2024-09-26 PROCEDURE — G0416: CPT | Mod: 26

## 2024-09-26 PROCEDURE — 55706 BX PRST8 NDL SAT SAMPLING: CPT

## 2024-09-26 RX ORDER — TAMSULOSIN HYDROCHLORIDE 0.4 MG/1
0.4 CAPSULE ORAL ONCE
Refills: 0 | Status: COMPLETED | OUTPATIENT
Start: 2024-09-26 | End: 2024-09-26

## 2024-09-26 RX ORDER — TAMSULOSIN HYDROCHLORIDE 0.4 MG/1
1 CAPSULE ORAL
Refills: 0 | DISCHARGE

## 2024-09-26 RX ORDER — TIRZEPATIDE 5 MG/.5ML
5 INJECTION, SOLUTION SUBCUTANEOUS
Refills: 0 | DISCHARGE

## 2024-09-26 RX ORDER — PANTOPRAZOLE SODIUM 40 MG
1 TABLET, DELAYED RELEASE (ENTERIC COATED) ORAL
Refills: 0 | DISCHARGE

## 2024-09-26 RX ORDER — ACETAMINOPHEN 325 MG/1
1000 TABLET ORAL ONCE
Refills: 0 | Status: COMPLETED | OUTPATIENT
Start: 2024-09-26 | End: 2024-09-26

## 2024-09-26 RX ORDER — ROSUVASTATIN CALCIUM 10 MG/1
1 TABLET ORAL
Refills: 0 | DISCHARGE

## 2024-09-26 RX ORDER — AMLODIPINE BESYLATE 10 MG/1
1 TABLET ORAL
Refills: 0 | DISCHARGE

## 2024-09-26 RX ORDER — FENTANYL CITRATE 50 UG/ML
25 INJECTION INTRAMUSCULAR; INTRAVENOUS
Refills: 0 | Status: DISCONTINUED | OUTPATIENT
Start: 2024-09-26 | End: 2024-09-26

## 2024-09-26 RX ADMIN — TAMSULOSIN HYDROCHLORIDE 0.4 MILLIGRAM(S): 0.4 CAPSULE ORAL at 15:03

## 2024-09-26 RX ADMIN — ACETAMINOPHEN 400 MILLIGRAM(S): 325 TABLET ORAL at 14:56

## 2024-09-26 NOTE — BRIEF OPERATIVE NOTE - NSICDXBRIEFPROCEDURE_GEN_ALL_CORE_FT
PROCEDURES:  Biopsy, prostate, perineal approach, with integrated MRI-US guidance 26-Sep-2024 11:28:28  Cindi Vizcaino

## 2024-09-26 NOTE — ASU DISCHARGE PLAN (ADULT/PEDIATRIC) - DIET/FLUID RESTRICTION
Pt son calling stating he feels that pt may be dehydrated. Wanting advice. Stated Monday pt was fine, Tuesday night pt told son he was not feeling well, Wednesday pt stated \"felt the worst he's felt in all his 80 years of life. Last night he was eating scallops and felt ill, vomited. Today sipping on fluids, feels weak, having leg cramps, back pain. Per Dr. Jameel Cintron, pt is to drink at least a quart of Gatorade by 6 pm tonight. If not able to do and/or not feeling better, pt instructed to go to ER for evaluation and possible IV fluids. Son verb understanding of instructions.
No change

## 2024-09-27 ENCOUNTER — NON-APPOINTMENT (OUTPATIENT)
Age: 64
End: 2024-09-27

## 2024-10-02 ENCOUNTER — APPOINTMENT (OUTPATIENT)
Dept: UROLOGY | Facility: AMBULATORY SURGERY CENTER | Age: 64
End: 2024-10-02

## 2024-10-02 LAB — SURGICAL PATHOLOGY STUDY: SIGNIFICANT CHANGE UP

## 2024-10-03 ENCOUNTER — NON-APPOINTMENT (OUTPATIENT)
Age: 64
End: 2024-10-03

## 2024-10-15 PROBLEM — N19 UNSPECIFIED KIDNEY FAILURE: Chronic | Status: ACTIVE | Noted: 2024-09-26

## 2024-10-15 PROBLEM — K21.9 GASTRO-ESOPHAGEAL REFLUX DISEASE WITHOUT ESOPHAGITIS: Chronic | Status: ACTIVE | Noted: 2024-09-26

## 2024-10-15 PROBLEM — E78.00 PURE HYPERCHOLESTEROLEMIA, UNSPECIFIED: Chronic | Status: ACTIVE | Noted: 2024-09-26

## 2024-10-15 PROBLEM — I10 ESSENTIAL (PRIMARY) HYPERTENSION: Chronic | Status: ACTIVE | Noted: 2024-09-26

## 2024-10-15 PROBLEM — K44.9 DIAPHRAGMATIC HERNIA WITHOUT OBSTRUCTION OR GANGRENE: Chronic | Status: ACTIVE | Noted: 2024-09-26

## 2024-10-28 ENCOUNTER — APPOINTMENT (OUTPATIENT)
Dept: UROLOGY | Facility: CLINIC | Age: 64
End: 2024-10-28
Payer: COMMERCIAL

## 2024-10-28 ENCOUNTER — NON-APPOINTMENT (OUTPATIENT)
Age: 64
End: 2024-10-28

## 2024-10-28 VITALS
HEART RATE: 102 BPM | OXYGEN SATURATION: 97 % | SYSTOLIC BLOOD PRESSURE: 130 MMHG | DIASTOLIC BLOOD PRESSURE: 84 MMHG | HEIGHT: 72 IN

## 2024-10-28 DIAGNOSIS — R37 SEXUAL DYSFUNCTION, UNSPECIFIED: ICD-10-CM

## 2024-10-28 DIAGNOSIS — R33.9 RETENTION OF URINE, UNSPECIFIED: ICD-10-CM

## 2024-10-28 DIAGNOSIS — R35.1 NOCTURIA: ICD-10-CM

## 2024-10-28 DIAGNOSIS — N40.1 BENIGN PROSTATIC HYPERPLASIA WITH LOWER URINARY TRACT SYMPMS: ICD-10-CM

## 2024-10-28 PROCEDURE — 99214 OFFICE O/P EST MOD 30 MIN: CPT

## 2024-10-29 ENCOUNTER — TRANSCRIPTION ENCOUNTER (OUTPATIENT)
Age: 64
End: 2024-10-29

## 2024-10-29 LAB
ANION GAP SERPL CALC-SCNC: 12 MMOL/L
BUN SERPL-MCNC: 17 MG/DL
CALCIUM SERPL-MCNC: 9.6 MG/DL
CHLORIDE SERPL-SCNC: 105 MMOL/L
CO2 SERPL-SCNC: 26 MMOL/L
CREAT SERPL-MCNC: 1.27 MG/DL
EGFR: 63 ML/MIN/1.73M2
GLUCOSE SERPL-MCNC: 91 MG/DL
HCT VFR BLD CALC: 49.9 %
HGB BLD-MCNC: 15.8 G/DL
MCHC RBC-ENTMCNC: 30.3 PG
MCHC RBC-ENTMCNC: 31.7 GM/DL
MCV RBC AUTO: 95.6 FL
PLATELET # BLD AUTO: 183 K/UL
POTASSIUM SERPL-SCNC: 4.5 MMOL/L
RBC # BLD: 5.22 M/UL
RBC # FLD: 13.2 %
SODIUM SERPL-SCNC: 143 MMOL/L
WBC # FLD AUTO: 8.2 K/UL

## 2024-10-30 DIAGNOSIS — N39.0 URINARY TRACT INFECTION, SITE NOT SPECIFIED: ICD-10-CM

## 2024-10-30 RX ORDER — AMOXICILLIN AND CLAVULANATE POTASSIUM 875; 125 MG/1; MG/1
875-125 TABLET, COATED ORAL
Qty: 14 | Refills: 0 | Status: ACTIVE | COMMUNITY
Start: 2024-10-30 | End: 1900-01-01

## 2024-11-05 LAB — BACTERIA UR CULT: ABNORMAL

## 2024-11-12 PROBLEM — G47.33 OBSTRUCTIVE SLEEP APNEA (ADULT) (PEDIATRIC): Chronic | Status: ACTIVE | Noted: 2024-09-26

## 2024-11-12 NOTE — ASU PATIENT PROFILE, ADULT - BLOOD TRANSFUSION, PREVIOUS, PROFILE
Refill Routing Note   Medication(s) are not appropriate for processing by [facility] for the following reason(s):        Outside of protocol    [facility] action(s):  Route               Appointments  past 12m or future 3m with PCP    Date Provider   Last Visit   8/5/2024 Susie Kemp MD   Next Visit   Visit date not found Susie Kemp MD   ED visits in past 90 days: 0        Note composed:11:26 AM 11/12/2024           no

## 2024-11-19 ENCOUNTER — APPOINTMENT (OUTPATIENT)
Dept: INTERVENTIONAL RADIOLOGY/VASCULAR | Facility: HOSPITAL | Age: 64
End: 2024-11-19

## 2024-11-19 ENCOUNTER — APPOINTMENT (OUTPATIENT)
Dept: UROLOGY | Facility: HOSPITAL | Age: 64
End: 2024-11-19

## 2024-11-19 ENCOUNTER — OUTPATIENT (OUTPATIENT)
Dept: OUTPATIENT SERVICES | Facility: HOSPITAL | Age: 64
LOS: 1 days | End: 2024-11-19
Payer: COMMERCIAL

## 2024-11-19 ENCOUNTER — RESULT REVIEW (OUTPATIENT)
Age: 64
End: 2024-11-19

## 2024-11-19 VITALS — WEIGHT: 186.95 LBS | HEIGHT: 77 IN

## 2024-11-19 DIAGNOSIS — H33.21 SEROUS RETINAL DETACHMENT, RIGHT EYE: Chronic | ICD-10-CM

## 2024-11-19 DIAGNOSIS — S42.002A FRACTURE OF UNSPECIFIED PART OF LEFT CLAVICLE, INITIAL ENCOUNTER FOR CLOSED FRACTURE: Chronic | ICD-10-CM

## 2024-11-19 DIAGNOSIS — Z98.41 CATARACT EXTRACTION STATUS, RIGHT EYE: Chronic | ICD-10-CM

## 2024-11-19 DIAGNOSIS — Z98.890 OTHER SPECIFIED POSTPROCEDURAL STATES: Chronic | ICD-10-CM

## 2024-11-19 DIAGNOSIS — I86.1 SCROTAL VARICES: Chronic | ICD-10-CM

## 2024-11-19 PROCEDURE — C2628: CPT

## 2024-11-19 PROCEDURE — 76937 US GUIDE VASCULAR ACCESS: CPT

## 2024-11-19 PROCEDURE — 75774 ARTERY X-RAY EACH VESSEL: CPT | Mod: 59

## 2024-11-19 PROCEDURE — C1769: CPT

## 2024-11-19 PROCEDURE — 75736 ARTERY X-RAYS PELVIS: CPT | Mod: 26,59

## 2024-11-19 PROCEDURE — 36247 INS CATH ABD/L-EXT ART 3RD: CPT | Mod: 50,59

## 2024-11-19 PROCEDURE — C1889: CPT

## 2024-11-19 PROCEDURE — 37243 VASC EMBOLIZE/OCCLUDE ORGAN: CPT

## 2024-11-19 PROCEDURE — 36248 INS CATH ABD/L-EXT ART ADDL: CPT

## 2024-11-19 PROCEDURE — 76937 US GUIDE VASCULAR ACCESS: CPT | Mod: 26

## 2024-11-19 PROCEDURE — C1894: CPT

## 2024-11-19 PROCEDURE — 36247 INS CATH ABD/L-EXT ART 3RD: CPT | Mod: 59

## 2024-11-19 PROCEDURE — 75774 ARTERY X-RAY EACH VESSEL: CPT | Mod: 26,59

## 2024-11-19 PROCEDURE — C1887: CPT

## 2024-11-19 PROCEDURE — 75736 ARTERY X-RAYS PELVIS: CPT | Mod: 59

## 2024-11-19 PROCEDURE — 36248 INS CATH ABD/L-EXT ART ADDL: CPT | Mod: 59

## 2024-11-19 NOTE — PROCEDURE NOTE - PROCEDURE FINDINGS AND DETAILS
right prostate artery embolization 0.6ml NBCA used  left prostate artery could not be identified/ was not present despite trying from internal iliac, pudendal and corona mortis  low risk of non target embolization

## 2024-11-19 NOTE — PRE-ANESTHESIA EVALUATION ADULT - NSANTHADDINFOFT_GEN_ALL_CORE
small hiatal hernia.  non active GERD currently.  no symptoms from mounjaro.    sedation with possible conversion to GETA

## 2024-11-20 ENCOUNTER — NON-APPOINTMENT (OUTPATIENT)
Age: 64
End: 2024-11-20

## 2024-11-20 RX ORDER — PREDNISONE 5 MG/1
5 TABLET ORAL
Qty: 12 | Refills: 0 | Status: ACTIVE | COMMUNITY
Start: 2024-11-20 | End: 1900-01-01

## 2024-12-18 ENCOUNTER — APPOINTMENT (OUTPATIENT)
Dept: UROLOGY | Facility: CLINIC | Age: 64
End: 2024-12-18
Payer: COMMERCIAL

## 2024-12-18 ENCOUNTER — NON-APPOINTMENT (OUTPATIENT)
Age: 64
End: 2024-12-18

## 2024-12-18 VITALS
SYSTOLIC BLOOD PRESSURE: 122 MMHG | HEART RATE: 90 BPM | TEMPERATURE: 94.5 F | WEIGHT: 187 LBS | BODY MASS INDEX: 25.33 KG/M2 | HEIGHT: 72 IN | OXYGEN SATURATION: 98 % | DIASTOLIC BLOOD PRESSURE: 79 MMHG

## 2024-12-18 DIAGNOSIS — N40.1 BENIGN PROSTATIC HYPERPLASIA WITH LOWER URINARY TRACT SYMPMS: ICD-10-CM

## 2024-12-18 PROCEDURE — 99213 OFFICE O/P EST LOW 20 MIN: CPT

## 2025-02-05 ENCOUNTER — APPOINTMENT (OUTPATIENT)
Dept: OPHTHALMOLOGY | Facility: CLINIC | Age: 65
End: 2025-02-05
Payer: COMMERCIAL

## 2025-02-05 ENCOUNTER — NON-APPOINTMENT (OUTPATIENT)
Age: 65
End: 2025-02-05

## 2025-02-05 PROCEDURE — 92012 INTRM OPH EXAM EST PATIENT: CPT

## 2025-02-05 PROCEDURE — 92133 CPTRZD OPH DX IMG PST SGM ON: CPT

## 2025-02-05 PROCEDURE — 92083 EXTENDED VISUAL FIELD XM: CPT

## 2025-02-05 PROCEDURE — 76514 ECHO EXAM OF EYE THICKNESS: CPT

## 2025-02-14 ENCOUNTER — APPOINTMENT (OUTPATIENT)
Dept: OPHTHALMOLOGY | Facility: CLINIC | Age: 65
End: 2025-02-14
Payer: COMMERCIAL

## 2025-02-14 ENCOUNTER — NON-APPOINTMENT (OUTPATIENT)
Age: 65
End: 2025-02-14

## 2025-02-14 PROCEDURE — 92012 INTRM OPH EXAM EST PATIENT: CPT

## 2025-02-24 ENCOUNTER — APPOINTMENT (OUTPATIENT)
Dept: UROLOGY | Facility: CLINIC | Age: 65
End: 2025-02-24

## 2025-02-24 ENCOUNTER — NON-APPOINTMENT (OUTPATIENT)
Age: 65
End: 2025-02-24

## 2025-04-30 ENCOUNTER — NON-APPOINTMENT (OUTPATIENT)
Age: 65
End: 2025-04-30

## 2025-04-30 ENCOUNTER — APPOINTMENT (OUTPATIENT)
Dept: UROLOGY | Facility: CLINIC | Age: 65
End: 2025-04-30
Payer: COMMERCIAL

## 2025-04-30 VITALS
DIASTOLIC BLOOD PRESSURE: 76 MMHG | BODY MASS INDEX: 25.73 KG/M2 | SYSTOLIC BLOOD PRESSURE: 146 MMHG | HEIGHT: 72 IN | HEART RATE: 83 BPM | TEMPERATURE: 97 F | OXYGEN SATURATION: 99 % | WEIGHT: 190 LBS

## 2025-04-30 VITALS — SYSTOLIC BLOOD PRESSURE: 108 MMHG | DIASTOLIC BLOOD PRESSURE: 72 MMHG

## 2025-04-30 DIAGNOSIS — N40.1 BENIGN PROSTATIC HYPERPLASIA WITH LOWER URINARY TRACT SYMPMS: ICD-10-CM

## 2025-04-30 DIAGNOSIS — R97.20 ELEVATED PROSTATE, SPECIFIC ANTIGEN [PSA]: ICD-10-CM

## 2025-04-30 PROCEDURE — 99213 OFFICE O/P EST LOW 20 MIN: CPT

## 2025-04-30 PROCEDURE — 51736 URINE FLOW MEASUREMENT: CPT

## 2025-04-30 RX ORDER — ALFUZOSIN HYDROCHLORIDE 10 MG/1
10 TABLET, EXTENDED RELEASE ORAL
Qty: 30 | Refills: 3 | Status: ACTIVE | COMMUNITY
Start: 2025-04-30 | End: 1900-01-01

## 2025-05-01 ENCOUNTER — TRANSCRIPTION ENCOUNTER (OUTPATIENT)
Age: 65
End: 2025-05-01

## 2025-05-01 LAB
PSA FREE FLD-MCNC: 28 %
PSA FREE SERPL-MCNC: 0.51 NG/ML
PSA SERPL-MCNC: 1.81 NG/ML

## 2025-06-19 ENCOUNTER — NON-APPOINTMENT (OUTPATIENT)
Age: 65
End: 2025-06-19

## 2025-06-19 ENCOUNTER — APPOINTMENT (OUTPATIENT)
Dept: OPHTHALMOLOGY | Facility: CLINIC | Age: 65
End: 2025-06-19
Payer: MEDICARE

## 2025-06-19 PROCEDURE — 92250 FUNDUS PHOTOGRAPHY W/I&R: CPT

## 2025-06-19 PROCEDURE — 92004 COMPRE OPH EXAM NEW PT 1/>: CPT

## 2025-06-19 PROCEDURE — 92083 EXTENDED VISUAL FIELD XM: CPT

## 2025-06-19 PROCEDURE — 92020 GONIOSCOPY: CPT

## 2025-07-18 ENCOUNTER — APPOINTMENT (OUTPATIENT)
Dept: CT IMAGING | Facility: CLINIC | Age: 65
End: 2025-07-18
Payer: COMMERCIAL

## 2025-07-18 ENCOUNTER — OUTPATIENT (OUTPATIENT)
Dept: OUTPATIENT SERVICES | Facility: HOSPITAL | Age: 65
LOS: 1 days | End: 2025-07-18

## 2025-07-18 DIAGNOSIS — Z98.890 OTHER SPECIFIED POSTPROCEDURAL STATES: Chronic | ICD-10-CM

## 2025-07-18 DIAGNOSIS — I86.1 SCROTAL VARICES: Chronic | ICD-10-CM

## 2025-07-18 DIAGNOSIS — H33.21 SEROUS RETINAL DETACHMENT, RIGHT EYE: Chronic | ICD-10-CM

## 2025-07-18 DIAGNOSIS — Z98.41 CATARACT EXTRACTION STATUS, RIGHT EYE: Chronic | ICD-10-CM

## 2025-07-18 DIAGNOSIS — S42.002A FRACTURE OF UNSPECIFIED PART OF LEFT CLAVICLE, INITIAL ENCOUNTER FOR CLOSED FRACTURE: Chronic | ICD-10-CM

## 2025-07-18 PROCEDURE — 75571 CT HRT W/O DYE W/CA TEST: CPT | Mod: 26

## 2025-07-28 ENCOUNTER — LABORATORY RESULT (OUTPATIENT)
Age: 65
End: 2025-07-28

## 2025-07-28 ENCOUNTER — APPOINTMENT (OUTPATIENT)
Dept: COLORECTAL SURGERY | Facility: CLINIC | Age: 65
End: 2025-07-28
Payer: MEDICARE

## 2025-07-28 ENCOUNTER — NON-APPOINTMENT (OUTPATIENT)
Age: 65
End: 2025-07-28

## 2025-07-28 VITALS
SYSTOLIC BLOOD PRESSURE: 117 MMHG | WEIGHT: 190 LBS | TEMPERATURE: 97.1 F | HEIGHT: 77 IN | HEART RATE: 93 BPM | DIASTOLIC BLOOD PRESSURE: 67 MMHG | BODY MASS INDEX: 22.43 KG/M2

## 2025-07-28 DIAGNOSIS — Z86.19 PERSONAL HISTORY OF OTHER INFECTIOUS AND PARASITIC DISEASES: ICD-10-CM

## 2025-07-28 PROCEDURE — 99202 OFFICE O/P NEW SF 15 MIN: CPT | Mod: 25

## 2025-07-28 PROCEDURE — 46600 DIAGNOSTIC ANOSCOPY SPX: CPT

## 2025-07-29 ENCOUNTER — APPOINTMENT (OUTPATIENT)
Dept: OTOLARYNGOLOGY | Facility: CLINIC | Age: 65
End: 2025-07-29
Payer: MEDICARE

## 2025-07-29 VITALS — HEIGHT: 77 IN | WEIGHT: 190 LBS | BODY MASS INDEX: 22.43 KG/M2

## 2025-07-29 DIAGNOSIS — H90.3 SENSORINEURAL HEARING LOSS, BILATERAL: ICD-10-CM

## 2025-07-29 DIAGNOSIS — H93.13 TINNITUS, BILATERAL: ICD-10-CM

## 2025-07-29 DIAGNOSIS — H91.13 PRESBYCUSIS, BILATERAL: ICD-10-CM

## 2025-07-29 PROCEDURE — 92557 COMPREHENSIVE HEARING TEST: CPT

## 2025-07-29 PROCEDURE — 99203 OFFICE O/P NEW LOW 30 MIN: CPT

## 2025-07-29 PROCEDURE — 92567 TYMPANOMETRY: CPT

## 2025-08-08 ENCOUNTER — NON-APPOINTMENT (OUTPATIENT)
Age: 65
End: 2025-08-08

## 2025-08-08 ENCOUNTER — TRANSCRIPTION ENCOUNTER (OUTPATIENT)
Age: 65
End: 2025-08-08

## 2025-08-08 LAB — ANAL PAP CYTOLOGY: NORMAL

## (undated) DEVICE — PLASTIC SOLUTION BOWL 160Z

## (undated) DEVICE — DRAPE TOWEL BLUE 17" X 24"

## (undated) DEVICE — SYR CATHETER TIP 50ML

## (undated) DEVICE — NDL MAX CORE 18G X 25CM

## (undated) DEVICE — SYR LUER LOK 50CC

## (undated) DEVICE — DRAPE MEDIUM SHEET 44" X 70"

## (undated) DEVICE — GRID BRACHYTHERAPY EZ 18G

## (undated) DEVICE — BALLOON ENDOCAVITY 2X14CM

## (undated) DEVICE — PREP CHLORAPREP HI-LITE ORANGE 26ML

## (undated) DEVICE — NDL BIOPSY CHIBA 22G X 20CM

## (undated) DEVICE — NDL HYPO REGULAR BEVEL 25G X 1.5" (BLUE)

## (undated) DEVICE — DRSG TELFA 3 X 8

## (undated) DEVICE — GLV 8 PROTEXIS (WHITE)

## (undated) DEVICE — SYR LUER LOK 10CC